# Patient Record
Sex: FEMALE | Race: BLACK OR AFRICAN AMERICAN | NOT HISPANIC OR LATINO | Employment: OTHER | URBAN - METROPOLITAN AREA
[De-identification: names, ages, dates, MRNs, and addresses within clinical notes are randomized per-mention and may not be internally consistent; named-entity substitution may affect disease eponyms.]

---

## 2023-11-03 ENCOUNTER — APPOINTMENT (OUTPATIENT)
Dept: CT IMAGING | Facility: HOSPITAL | Age: 63
End: 2023-11-03
Payer: COMMERCIAL

## 2023-11-03 ENCOUNTER — HOSPITAL ENCOUNTER (OUTPATIENT)
Facility: HOSPITAL | Age: 63
Setting detail: OBSERVATION
Discharge: HOME | End: 2023-11-06
Attending: EMERGENCY MEDICINE | Admitting: STUDENT IN AN ORGANIZED HEALTH CARE EDUCATION/TRAINING PROGRAM
Payer: COMMERCIAL

## 2023-11-03 DIAGNOSIS — R10.9 ACUTE ABDOMINAL PAIN: ICD-10-CM

## 2023-11-03 DIAGNOSIS — K57.32 SIGMOID DIVERTICULITIS: Primary | ICD-10-CM

## 2023-11-03 PROBLEM — K57.92 DIVERTICULITIS: Status: ACTIVE | Noted: 2023-11-03

## 2023-11-03 PROBLEM — E86.0 DEHYDRATION: Status: ACTIVE | Noted: 2023-11-03

## 2023-11-03 PROBLEM — K59.00 CONSTIPATION: Status: ACTIVE | Noted: 2023-11-03

## 2023-11-03 LAB
ALBUMIN SERPL-MCNC: 4.2 G/DL (ref 3.5–5.2)
ALBUMIN/GLOB SERPL: 1.2 G/DL
ALP SERPL-CCNC: 58 U/L (ref 39–117)
ALT SERPL W P-5'-P-CCNC: 17 U/L (ref 1–33)
ANION GAP SERPL CALCULATED.3IONS-SCNC: 11.6 MMOL/L (ref 5–15)
AST SERPL-CCNC: 16 U/L (ref 1–32)
BACTERIA UR QL AUTO: ABNORMAL /HPF
BASOPHILS # BLD AUTO: 0.06 10*3/MM3 (ref 0–0.2)
BASOPHILS NFR BLD AUTO: 0.4 % (ref 0–1.5)
BILIRUB SERPL-MCNC: 0.6 MG/DL (ref 0–1.2)
BILIRUB UR QL STRIP: NEGATIVE
BUN SERPL-MCNC: 14 MG/DL (ref 8–23)
BUN/CREAT SERPL: 11.4 (ref 7–25)
CALCIUM SPEC-SCNC: 9 MG/DL (ref 8.6–10.5)
CHLORIDE SERPL-SCNC: 105 MMOL/L (ref 98–107)
CLARITY UR: CLEAR
CO2 SERPL-SCNC: 22.4 MMOL/L (ref 22–29)
COLOR UR: YELLOW
CREAT SERPL-MCNC: 1.23 MG/DL (ref 0.57–1)
D-LACTATE SERPL-SCNC: 0.8 MMOL/L (ref 0.5–2)
DEPRECATED RDW RBC AUTO: 46.3 FL (ref 37–54)
EGFRCR SERPLBLD CKD-EPI 2021: 49.8 ML/MIN/1.73
EOSINOPHIL # BLD AUTO: 0.19 10*3/MM3 (ref 0–0.4)
EOSINOPHIL NFR BLD AUTO: 1.3 % (ref 0.3–6.2)
ERYTHROCYTE [DISTWIDTH] IN BLOOD BY AUTOMATED COUNT: 14.5 % (ref 12.3–15.4)
GLOBULIN UR ELPH-MCNC: 3.4 GM/DL
GLUCOSE SERPL-MCNC: 122 MG/DL (ref 65–99)
GLUCOSE UR STRIP-MCNC: NEGATIVE MG/DL
HCT VFR BLD AUTO: 38.3 % (ref 34–46.6)
HGB BLD-MCNC: 12.5 G/DL (ref 12–15.9)
HGB UR QL STRIP.AUTO: ABNORMAL
HYALINE CASTS UR QL AUTO: ABNORMAL /LPF
IMM GRANULOCYTES # BLD AUTO: 0.03 10*3/MM3 (ref 0–0.05)
IMM GRANULOCYTES NFR BLD AUTO: 0.2 % (ref 0–0.5)
KETONES UR QL STRIP: NEGATIVE
LEUKOCYTE ESTERASE UR QL STRIP.AUTO: ABNORMAL
LIPASE SERPL-CCNC: 16 U/L (ref 13–60)
LYMPHOCYTES # BLD AUTO: 3.02 10*3/MM3 (ref 0.7–3.1)
LYMPHOCYTES NFR BLD AUTO: 21.1 % (ref 19.6–45.3)
MAGNESIUM SERPL-MCNC: 2 MG/DL (ref 1.6–2.4)
MCH RBC QN AUTO: 28.5 PG (ref 26.6–33)
MCHC RBC AUTO-ENTMCNC: 32.6 G/DL (ref 31.5–35.7)
MCV RBC AUTO: 87.2 FL (ref 79–97)
MONOCYTES # BLD AUTO: 1.35 10*3/MM3 (ref 0.1–0.9)
MONOCYTES NFR BLD AUTO: 9.4 % (ref 5–12)
NEUTROPHILS NFR BLD AUTO: 67.6 % (ref 42.7–76)
NEUTROPHILS NFR BLD AUTO: 9.69 10*3/MM3 (ref 1.7–7)
NITRITE UR QL STRIP: NEGATIVE
NRBC BLD AUTO-RTO: 0 /100 WBC (ref 0–0.2)
PH UR STRIP.AUTO: 7.5 [PH] (ref 5–8)
PLATELET # BLD AUTO: 273 10*3/MM3 (ref 140–450)
PMV BLD AUTO: 9.7 FL (ref 6–12)
POTASSIUM SERPL-SCNC: 4 MMOL/L (ref 3.5–5.2)
PROT SERPL-MCNC: 7.6 G/DL (ref 6–8.5)
PROT UR QL STRIP: ABNORMAL
RBC # BLD AUTO: 4.39 10*6/MM3 (ref 3.77–5.28)
RBC # UR STRIP: ABNORMAL /HPF
REF LAB TEST METHOD: ABNORMAL
SODIUM SERPL-SCNC: 139 MMOL/L (ref 136–145)
SP GR UR STRIP: 1.01 (ref 1–1.03)
SQUAMOUS #/AREA URNS HPF: ABNORMAL /HPF
UROBILINOGEN UR QL STRIP: ABNORMAL
WBC # UR STRIP: ABNORMAL /HPF
WBC NRBC COR # BLD: 14.34 10*3/MM3 (ref 3.4–10.8)

## 2023-11-03 PROCEDURE — 96365 THER/PROPH/DIAG IV INF INIT: CPT

## 2023-11-03 PROCEDURE — 99285 EMERGENCY DEPT VISIT HI MDM: CPT

## 2023-11-03 PROCEDURE — 25010000002 MORPHINE PER 10 MG: Performed by: EMERGENCY MEDICINE

## 2023-11-03 PROCEDURE — G0378 HOSPITAL OBSERVATION PER HR: HCPCS

## 2023-11-03 PROCEDURE — 25510000001 IOPAMIDOL PER 1 ML: Performed by: EMERGENCY MEDICINE

## 2023-11-03 PROCEDURE — 25010000002 PIPERACILLIN SOD-TAZOBACTAM PER 1 G: Performed by: EMERGENCY MEDICINE

## 2023-11-03 PROCEDURE — 83605 ASSAY OF LACTIC ACID: CPT | Performed by: EMERGENCY MEDICINE

## 2023-11-03 PROCEDURE — 25010000002 PIPERACILLIN SOD-TAZOBACTAM PER 1 G: Performed by: INTERNAL MEDICINE

## 2023-11-03 PROCEDURE — 83735 ASSAY OF MAGNESIUM: CPT | Performed by: EMERGENCY MEDICINE

## 2023-11-03 PROCEDURE — 99203 OFFICE O/P NEW LOW 30 MIN: CPT | Performed by: SURGERY

## 2023-11-03 PROCEDURE — 96366 THER/PROPH/DIAG IV INF ADDON: CPT

## 2023-11-03 PROCEDURE — 87040 BLOOD CULTURE FOR BACTERIA: CPT | Performed by: EMERGENCY MEDICINE

## 2023-11-03 PROCEDURE — 83690 ASSAY OF LIPASE: CPT | Performed by: EMERGENCY MEDICINE

## 2023-11-03 PROCEDURE — 25810000003 SODIUM CHLORIDE 0.9 % SOLUTION: Performed by: EMERGENCY MEDICINE

## 2023-11-03 PROCEDURE — 96376 TX/PRO/DX INJ SAME DRUG ADON: CPT

## 2023-11-03 PROCEDURE — 80053 COMPREHEN METABOLIC PANEL: CPT | Performed by: EMERGENCY MEDICINE

## 2023-11-03 PROCEDURE — 36415 COLL VENOUS BLD VENIPUNCTURE: CPT

## 2023-11-03 PROCEDURE — 85025 COMPLETE CBC W/AUTO DIFF WBC: CPT | Performed by: EMERGENCY MEDICINE

## 2023-11-03 PROCEDURE — 25010000002 SODIUM CHLORIDE 0.9 % WITH KCL 20 MEQ 20-0.9 MEQ/L-% SOLUTION: Performed by: INTERNAL MEDICINE

## 2023-11-03 PROCEDURE — 81001 URINALYSIS AUTO W/SCOPE: CPT | Performed by: EMERGENCY MEDICINE

## 2023-11-03 PROCEDURE — 74177 CT ABD & PELVIS W/CONTRAST: CPT

## 2023-11-03 PROCEDURE — 25010000002 MORPHINE PER 10 MG: Performed by: INTERNAL MEDICINE

## 2023-11-03 PROCEDURE — 96375 TX/PRO/DX INJ NEW DRUG ADDON: CPT

## 2023-11-03 RX ORDER — DOXEPIN HYDROCHLORIDE 10 MG/1
10 CAPSULE ORAL NIGHTLY
COMMUNITY

## 2023-11-03 RX ORDER — CITALOPRAM 20 MG/1
20 TABLET ORAL DAILY
COMMUNITY

## 2023-11-03 RX ORDER — SODIUM CHLORIDE 0.9 % (FLUSH) 0.9 %
10 SYRINGE (ML) INJECTION EVERY 12 HOURS SCHEDULED
Status: DISCONTINUED | OUTPATIENT
Start: 2023-11-03 | End: 2023-11-06 | Stop reason: HOSPADM

## 2023-11-03 RX ORDER — AZELASTINE 1 MG/ML
1-2 SPRAY, METERED NASAL 2 TIMES DAILY
COMMUNITY

## 2023-11-03 RX ORDER — MORPHINE SULFATE 2 MG/ML
4 INJECTION, SOLUTION INTRAMUSCULAR; INTRAVENOUS ONCE
Status: COMPLETED | OUTPATIENT
Start: 2023-11-03 | End: 2023-11-03

## 2023-11-03 RX ORDER — BENZONATATE 100 MG/1
100 CAPSULE ORAL 3 TIMES DAILY PRN
COMMUNITY

## 2023-11-03 RX ORDER — TOPIRAMATE 50 MG/1
50 TABLET, FILM COATED ORAL EVERY MORNING
COMMUNITY

## 2023-11-03 RX ORDER — BUPROPION HYDROCHLORIDE 150 MG/1
150 TABLET, EXTENDED RELEASE ORAL 2 TIMES DAILY
COMMUNITY

## 2023-11-03 RX ORDER — ATORVASTATIN CALCIUM 10 MG/1
10 TABLET, FILM COATED ORAL DAILY
COMMUNITY

## 2023-11-03 RX ORDER — TOPIRAMATE 100 MG/1
100 TABLET, FILM COATED ORAL NIGHTLY
Status: DISCONTINUED | OUTPATIENT
Start: 2023-11-03 | End: 2023-11-06 | Stop reason: HOSPADM

## 2023-11-03 RX ORDER — DOXEPIN HYDROCHLORIDE 10 MG/1
10 CAPSULE ORAL NIGHTLY
Status: DISCONTINUED | OUTPATIENT
Start: 2023-11-03 | End: 2023-11-06 | Stop reason: HOSPADM

## 2023-11-03 RX ORDER — BUPROPION HYDROCHLORIDE 150 MG/1
150 TABLET, EXTENDED RELEASE ORAL 2 TIMES DAILY
Status: DISCONTINUED | OUTPATIENT
Start: 2023-11-03 | End: 2023-11-06 | Stop reason: HOSPADM

## 2023-11-03 RX ORDER — FLUTICASONE PROPIONATE 50 MCG
2 SPRAY, SUSPENSION (ML) NASAL DAILY
COMMUNITY

## 2023-11-03 RX ORDER — TOPIRAMATE 50 MG/1
100 TABLET, FILM COATED ORAL EVERY EVENING
COMMUNITY

## 2023-11-03 RX ORDER — SODIUM CHLORIDE AND POTASSIUM CHLORIDE 150; 900 MG/100ML; MG/100ML
50 INJECTION, SOLUTION INTRAVENOUS CONTINUOUS
Status: DISCONTINUED | OUTPATIENT
Start: 2023-11-03 | End: 2023-11-06 | Stop reason: HOSPADM

## 2023-11-03 RX ORDER — OLOPATADINE HYDROCHLORIDE 1 MG/ML
1 SOLUTION/ DROPS OPHTHALMIC 2 TIMES DAILY PRN
COMMUNITY

## 2023-11-03 RX ORDER — ALBUTEROL SULFATE 90 UG/1
2 AEROSOL, METERED RESPIRATORY (INHALATION) EVERY 6 HOURS PRN
COMMUNITY

## 2023-11-03 RX ORDER — CITALOPRAM 20 MG/1
20 TABLET ORAL DAILY
Status: DISCONTINUED | OUTPATIENT
Start: 2023-11-03 | End: 2023-11-06 | Stop reason: HOSPADM

## 2023-11-03 RX ORDER — SODIUM CHLORIDE 9 MG/ML
40 INJECTION, SOLUTION INTRAVENOUS AS NEEDED
Status: DISCONTINUED | OUTPATIENT
Start: 2023-11-03 | End: 2023-11-06 | Stop reason: HOSPADM

## 2023-11-03 RX ORDER — ACETAMINOPHEN 160 MG/5ML
650 SOLUTION ORAL EVERY 4 HOURS PRN
Status: DISCONTINUED | OUTPATIENT
Start: 2023-11-03 | End: 2023-11-06 | Stop reason: HOSPADM

## 2023-11-03 RX ORDER — ONDANSETRON 2 MG/ML
4 INJECTION INTRAMUSCULAR; INTRAVENOUS EVERY 6 HOURS PRN
Status: DISCONTINUED | OUTPATIENT
Start: 2023-11-03 | End: 2023-11-06 | Stop reason: HOSPADM

## 2023-11-03 RX ORDER — ACETAMINOPHEN 325 MG/1
650 TABLET ORAL EVERY 4 HOURS PRN
Status: DISCONTINUED | OUTPATIENT
Start: 2023-11-03 | End: 2023-11-06 | Stop reason: HOSPADM

## 2023-11-03 RX ORDER — ACETAMINOPHEN 650 MG/1
650 SUPPOSITORY RECTAL EVERY 4 HOURS PRN
Status: DISCONTINUED | OUTPATIENT
Start: 2023-11-03 | End: 2023-11-06 | Stop reason: HOSPADM

## 2023-11-03 RX ORDER — TOPIRAMATE 50 MG/1
50 TABLET, FILM COATED ORAL DAILY
Status: DISCONTINUED | OUTPATIENT
Start: 2023-11-04 | End: 2023-11-06 | Stop reason: HOSPADM

## 2023-11-03 RX ORDER — SODIUM CHLORIDE 0.9 % (FLUSH) 0.9 %
10 SYRINGE (ML) INJECTION AS NEEDED
Status: DISCONTINUED | OUTPATIENT
Start: 2023-11-03 | End: 2023-11-06 | Stop reason: HOSPADM

## 2023-11-03 RX ORDER — ALBUTEROL SULFATE 2.5 MG/3ML
2.5 SOLUTION RESPIRATORY (INHALATION) EVERY 6 HOURS PRN
Status: DISCONTINUED | OUTPATIENT
Start: 2023-11-03 | End: 2023-11-06 | Stop reason: HOSPADM

## 2023-11-03 RX ORDER — MORPHINE SULFATE 2 MG/ML
4 INJECTION, SOLUTION INTRAMUSCULAR; INTRAVENOUS EVERY 4 HOURS PRN
Status: DISCONTINUED | OUTPATIENT
Start: 2023-11-03 | End: 2023-11-06 | Stop reason: HOSPADM

## 2023-11-03 RX ADMIN — TOPIRAMATE 100 MG: 100 TABLET, FILM COATED ORAL at 20:24

## 2023-11-03 RX ADMIN — PIPERACILLIN SODIUM AND TAZOBACTAM SODIUM 3.38 G: 3; .375 INJECTION, SOLUTION INTRAVENOUS at 14:28

## 2023-11-03 RX ADMIN — BUPROPION HYDROCHLORIDE 150 MG: 150 TABLET, EXTENDED RELEASE ORAL at 20:23

## 2023-11-03 RX ADMIN — MORPHINE SULFATE 4 MG: 2 INJECTION, SOLUTION INTRAMUSCULAR; INTRAVENOUS at 17:44

## 2023-11-03 RX ADMIN — MORPHINE SULFATE 4 MG: 2 INJECTION, SOLUTION INTRAMUSCULAR; INTRAVENOUS at 12:31

## 2023-11-03 RX ADMIN — IOPAMIDOL 85 ML: 755 INJECTION, SOLUTION INTRAVENOUS at 13:21

## 2023-11-03 RX ADMIN — MORPHINE SULFATE 4 MG: 2 INJECTION, SOLUTION INTRAMUSCULAR; INTRAVENOUS at 22:16

## 2023-11-03 RX ADMIN — Medication 10 ML: at 20:13

## 2023-11-03 RX ADMIN — POTASSIUM CHLORIDE AND SODIUM CHLORIDE 100 ML/HR: 900; 150 INJECTION, SOLUTION INTRAVENOUS at 19:58

## 2023-11-03 RX ADMIN — SODIUM CHLORIDE 1000 ML: 9 INJECTION, SOLUTION INTRAVENOUS at 12:31

## 2023-11-03 RX ADMIN — CITALOPRAM 20 MG: 20 TABLET, FILM COATED ORAL at 20:23

## 2023-11-03 RX ADMIN — PIPERACILLIN SODIUM AND TAZOBACTAM SODIUM 3.38 G: 3; .375 INJECTION, SOLUTION INTRAVENOUS at 19:59

## 2023-11-03 RX ADMIN — DOXEPIN HYDROCHLORIDE 10 MG: 10 CAPSULE ORAL at 20:24

## 2023-11-03 NOTE — ED NOTES
Nursing report ED to floor  Marzena Morfin  63 y.o.  female    HPI :   Chief Complaint   Patient presents with    Abdominal Pain       Admitting doctor:   Derick Anders MD    Admitting diagnosis:   The encounter diagnosis was Sigmoid diverticulitis.    Code status:   Current Code Status       Date Active Code Status Order ID Comments User Context       Not on file            Allergies:   Erythromycin    Isolation:   No active isolations    Intake and Output    Intake/Output Summary (Last 24 hours) at 11/3/2023 1603  Last data filed at 11/3/2023 1540  Gross per 24 hour   Intake 1050 ml   Output --   Net 1050 ml       Weight:       11/03/23  1136   Weight: 89.8 kg (198 lb)       Most recent vitals:   Vitals:    11/03/23 1159 11/03/23 1259 11/03/23 1333 11/03/23 1539   BP: 118/79 123/71 121/67    BP Location:       Patient Position:       Pulse: 79 74 79 80   Resp:       Temp:       SpO2: 97% 96% 96% 99%   Weight:       Height:           Active LDAs/IV Access:   Lines, Drains & Airways       Active LDAs       Name Placement date Placement time Site Days    Peripheral IV 11/03/23 1142 Right Antecubital 11/03/23  1142  Antecubital  less than 1                    Labs (abnormal labs have a star):   Labs Reviewed   COMPREHENSIVE METABOLIC PANEL - Abnormal; Notable for the following components:       Result Value    Glucose 122 (*)     Creatinine 1.23 (*)     eGFR 49.8 (*)     All other components within normal limits    Narrative:     GFR Normal >60  Chronic Kidney Disease <60  Kidney Failure <15     URINALYSIS W/ MICROSCOPIC IF INDICATED (NO CULTURE) - Abnormal; Notable for the following components:    Blood, UA Moderate (2+) (*)     Protein, UA Trace (*)     Leuk Esterase, UA Trace (*)     All other components within normal limits   CBC WITH AUTO DIFFERENTIAL - Abnormal; Notable for the following components:    WBC 14.34 (*)     Neutrophils, Absolute 9.69 (*)     Monocytes, Absolute 1.35 (*)     All other components within  normal limits   URINALYSIS, MICROSCOPIC ONLY - Abnormal; Notable for the following components:    RBC, UA 6-10 (*)     All other components within normal limits   LIPASE - Normal   MAGNESIUM - Normal   LACTIC ACID, PLASMA - Normal   BLOOD CULTURE   BLOOD CULTURE   CBC AND DIFFERENTIAL    Narrative:     The following orders were created for panel order CBC & Differential.  Procedure                               Abnormality         Status                     ---------                               -----------         ------                     CBC Auto Differential[[id number]]        Abnormal            Final result                 Please view results for these tests on the individual orders.       EKG:   No orders to display       Meds given in ED:   Medications   morphine injection 4 mg (4 mg Intravenous Given 11/3/23 1231)   sodium chloride 0.9 % bolus 1,000 mL (0 mL Intravenous Stopped 11/3/23 1356)   iopamidol (ISOVUE-370) 76 % injection 100 mL (85 mL Intravenous Given by Other 11/3/23 1321)   piperacillin-tazobactam (ZOSYN) 3.375 g in iso-osmotic dextrose 50 ml (premix) (0 g Intravenous Stopped 11/3/23 1540)       Imaging results:  No radiology results for the last day    Ambulatory status:   - standby    Social issues:   Social History     Socioeconomic History    Marital status:        NIH Stroke Scale:       Rosy Hinkle RN  11/03/23 16:03 EDT

## 2023-11-03 NOTE — CONSULTS
General Surgery H&P/Consultation      Impression/Plan: 63-year-old lady admitted with acute diverticulitis without perforation.  Agree with clear liquid diet.  Continue Zosyn, IV fluids.  I will follow.  She will need colonoscopy after this episode resolves if nonoperative management successful.      CC: Abdominal pain    HPI:   Miss Marzena Morfin is a 63 y.o. female that presented to the hospital with 2-day history of lower abdominal pain.  She also reports mild lower abdominal pain 2 weeks ago which resolved spontaneously.  She denies prior episodes of diverticulitis.  She has previously had a colonoscopy which was without polyps.  He recently completed a Cologuard which was negative.    Past Medical History: Hyperlipidemia, depression, asthma, BOOGIE  History reviewed. No pertinent past medical history.    Past Surgical History: Hysterectomy for hemorrhage at time of  45 years ago  History reviewed. No pertinent surgical history.    Medications:  Medications Prior to Admission   Medication Sig Dispense Refill Last Dose    albuterol sulfate  (90 Base) MCG/ACT inhaler Inhale 2 puffs Every 6 (Six) Hours As Needed for Wheezing.       atorvastatin (LIPITOR) 10 MG tablet Take 1 tablet by mouth Daily.       azelastine (ASTELIN) 0.1 % nasal spray 1-2 sprays into the nostril(s) as directed by provider 2 (Two) Times a Day.       benzonatate (TESSALON) 100 MG capsule Take 1 capsule by mouth 3 (Three) Times a Day As Needed for Cough.       buPROPion SR (WELLBUTRIN SR) 150 MG 12 hr tablet Take 1 tablet by mouth 2 (Two) Times a Day.       citalopram (CeleXA) 20 MG tablet Take 1 tablet by mouth Daily.       doxepin (SINEquan) 10 MG capsule Take 1 capsule by mouth Every Night.       fluticasone (FLONASE) 50 MCG/ACT nasal spray 2 sprays into the nostril(s) as directed by provider Daily.       olopatadine (PATANOL) 0.1 % ophthalmic solution Apply 1 drop to eye(s) as directed by provider 2 (Two) Times a Day As Needed  for Allergies.       topiramate (TOPAMAX) 50 MG tablet Take 1 tablet by mouth Every Morning.       topiramate (TOPAMAX) 50 MG tablet Take 2 tablets by mouth Every Evening.          Allergies:   Allergies   Allergen Reactions    Erythromycin Nausea Only       Social History:   Social History     Socioeconomic History    Marital status:    Tobacco Use    Smoking status: Never    Smokeless tobacco: Never   Vaping Use    Vaping Use: Never used   Substance and Sexual Activity    Alcohol use: Never    Drug use: Never    Sexual activity: Never       Family History:   History reviewed. No pertinent family history.    Review of Systems:  A comprehensive review of systems was negative except for the following positives: Abdominal pain    Physical Exam:   Vitals:    11/03/23 1806   BP: 122/78   Pulse: 79   Resp: 16   Temp: 99.3 °F (37.4 °C)   SpO2: 98%     BMI: Body mass index is 33.99 kg/m².   GENERAL: no acute distress, awake and alert  HEENT: normocephalic, atraumatic, no scleral icterus, moist mucous membranes  NECK: Supple, there is no thyromegaly or lymphadenopathy  RESPIRATORY: symmetric excursion bilaterally, normal work of breathing, no wheezes  CARDIOVASCULAR: regular rate, well perfused  GASTROINTESTINAL: Soft, mildly tender to palpation across the lower abdomen, more pronounced in left lower quadrant and right lower quadrant.  No peritoneal signs   MUSCULOSKELETAL: no cyanosis, clubbing, or edema  NEUROLOGIC: alert and oriented, normal speech, cranial nerves 2-12 grossly intact, no focal deficits  SKIN: Moist, warm, no rashes, no jaundice      Pertinent labs:   Results from last 7 days   Lab Units 11/03/23  1142   WBC 10*3/mm3 14.34*   HEMOGLOBIN g/dL 12.5   HEMATOCRIT % 38.3   PLATELETS 10*3/mm3 273     Results from last 7 days   Lab Units 11/03/23  1142   SODIUM mmol/L 139   POTASSIUM mmol/L 4.0   CHLORIDE mmol/L 105   CO2 mmol/L 22.4   BUN mg/dL 14   CREATININE mg/dL 1.23*   CALCIUM mg/dL 9.0   BILIRUBIN  mg/dL 0.6   ALK PHOS U/L 58   ALT (SGPT) U/L 17   AST (SGOT) U/L 16   GLUCOSE mg/dL 122*       IMAGING:  CT abdomen pelvis reviewed demonstrating significant inflammation and stranding surrounding the proximal sigmoid colon with small amount of free fluid without organized abscess           Adam Robles MD  General and Endoscopic Surgery  [facility]    [address], [address]  [place] [address]  P: [phone]  F: [phone]

## 2023-11-03 NOTE — H&P
Internal medicine history and physical  INTERNAL MEDICINE   [facility]       Patient Identification:  Name: Marzena Morfin  Age: 63 y.o.  Sex: female  :  1960  MRN: [id number]                   Primary Care Physician: Cole Cabral                               Date of admission:11/3/2023    Chief Complaint: Progressive abdominal discomfort along with nausea and fever and chills for the last couple days.    History of Present Illness:   Patient is a 63-year-old female with past medical history remarkable for fibromyalgia, anxiety and allergies was in her usual state of her health until this past Monday and she had loose bowel movements and diarrhea which she attributed to having lots of greasy food over the weekend as part of the celebration of her birthday.  Patient gets his episodes of constipation and abdominal discomfort as part of her menstrual cycle although she has had hysterectomy and started noticing abdominal discomfort and constipation from Tuesday onwards coinciding with her previous pattern.  She did have a couple of episodes of abdominal discomfort few weeks ago that resolved on its own.  By Wednesday evening patient started feeling poorly with fever nausea headache and feeling out of sorts.  Patient had a good day yesterday she had some things done but later in the evening last night she started having similar symptoms and worse.  This morning she was in severe pain associated fever and not feeling very well and did not have any bowel movement.  She had only 1 bowel movement since Tuesday which was yesterday and was very small hard stool.  Patient usually does not get constipated.  Patient has had colonoscopy by Dr. Vasquez long time ago.  Work-up in the emergency room revealed acute sigmoid diverticulitis without perforation or free air.  Patient has been started on IV Zosyn and is being admitted for further care.      Past Medical History:  No past medical history on  "file.  Past Surgical History:  No past surgical history on file.   Home Meds:  Medications Prior to Admission   Medication Sig Dispense Refill Last Dose    albuterol sulfate  (90 Base) MCG/ACT inhaler Inhale 2 puffs Every 6 (Six) Hours As Needed for Wheezing.       atorvastatin (LIPITOR) 10 MG tablet Take 1 tablet by mouth Daily.       azelastine (ASTELIN) 0.1 % nasal spray 1-2 sprays into the nostril(s) as directed by provider 2 (Two) Times a Day.       benzonatate (TESSALON) 100 MG capsule Take 1 capsule by mouth 3 (Three) Times a Day As Needed for Cough.       buPROPion SR (WELLBUTRIN SR) 150 MG 12 hr tablet Take 1 tablet by mouth 2 (Two) Times a Day.       citalopram (CeleXA) 20 MG tablet Take 1 tablet by mouth Daily.       doxepin (SINEquan) 10 MG capsule Take 1 capsule by mouth Every Night.       fluticasone (FLONASE) 50 MCG/ACT nasal spray 2 sprays into the nostril(s) as directed by provider Daily.       olopatadine (PATANOL) 0.1 % ophthalmic solution Apply 1 drop to eye(s) as directed by provider 2 (Two) Times a Day As Needed for Allergies.       topiramate (TOPAMAX) 50 MG tablet Take 1 tablet by mouth Every Morning.       topiramate (TOPAMAX) 50 MG tablet Take 2 tablets by mouth Every Evening.        Current Meds:   No current facility-administered medications for this encounter.  Allergies:  Allergies   Allergen Reactions    Erythromycin Nausea Only     Social History:   Social History     Tobacco Use    Smoking status: Not on file    Smokeless tobacco: Not on file   Substance Use Topics    Alcohol use: Not on file      Family History:  No family history on file.       Review of Systems  See history of present illness and past medical history.    As described in history presenting illness.      Vitals:   /61   Pulse 77   Temp 99.6 °F (37.6 °C)   Resp 18   Ht 162.6 cm (64\")   Wt 89.8 kg (198 lb)   SpO2 100%   BMI 33.99 kg/m²   I/O:   Intake/Output Summary (Last 24 hours) at 11/3/2023 " 1803  Last data filed at 11/3/2023 1540  Gross per 24 hour   Intake 1050 ml   Output --   Net 1050 ml     Exam:  Patient is examined using the personal protective equipment as per guidelines from infection control for this particular patient as enacted.  Hand washing was performed before and after patient interaction.  General Appearance:    Alert, cooperative, no distress, appears stated age   Head:    Normocephalic, without obvious abnormality, atraumatic   Eyes:    PERRL, conjunctiva/corneas clear, EOM's intact, both eyes   Ears:    Normal external ear canals, both ears   Nose:   Nares normal, septum midline, mucosa normal, no drainage    or sinus tenderness   Throat:   Lips, tongue, gums normal; oral mucosa pink and moist   Neck:   Supple, symmetrical, trachea midline, no adenopathy;     thyroid:  no enlargement/tenderness/nodules; no carotid    bruit or JVD   Back:     Symmetric, no curvature, ROM normal, no CVA tenderness   Lungs:     Clear to auscultation bilaterally, respirations unlabored   Chest Wall:    No tenderness or deformity    Heart:    Regular rate and rhythm, S1 and S2 normal, no murmur, rub   or gallop   Abdomen:   Soft mild generalized tenderness more on the left lower quadrant but no guarding rigidity or rebound noted   Extremities:   Extremities normal, atraumatic, no cyanosis or edema   Pulses:   Pulses palpable in all extremities; symmetric all extremities   Skin:   Skin color normal, Skin is warm and dry,  no rashes or palpable lesions   Neurologic:   CNII-XII intact, motor strength grossly intact, sensation grossly intact to light touch, no focal deficits noted       Data Review:      I reviewed the patient's new clinical results.  Results from last 7 days   Lab Units 11/03/23  1142   WBC 10*3/mm3 14.34*   HEMOGLOBIN g/dL 12.5   PLATELETS 10*3/mm3 273     Results from last 7 days   Lab Units 11/03/23  1142   SODIUM mmol/L 139   POTASSIUM mmol/L 4.0   CHLORIDE mmol/L 105   CO2 mmol/L 22.4    BUN mg/dL 14   CREATININE mg/dL 1.23*   CALCIUM mg/dL 9.0   GLUCOSE mg/dL 122*     No radiology results for the last 30 days.  No radiology results for the last 30 days.  Microbiology Results (last 10 days)       ** No results found for the last 240 hours. **            Assessment:  Active Hospital Problems    Diagnosis  POA    **Diverticulitis [K57.92]  Yes    Dehydration [E86.0]  Unknown    Constipation [K59.00]  Unknown       Medical decision making/care plan: See admitting orders  Acute sigmoid diverticulitis-continue with IV Zosyn clear liquids pain medications and general surgery consultation.  Dehydration-continue with IV fluids and monitor.  History of fibromyalgia anxiety and depression-continue home regimen.    Derick Anders MD   11/3/2023  18:03 EDT    Parts of this note may be an electronic transcription/translation of spoken language to printed text using the Dragon dictation system.

## 2023-11-03 NOTE — ED PROVIDER NOTES
EMERGENCY DEPARTMENT ENCOUNTER    Room Number:  22/22  PCP: Cole Cabral  Historian: Patient      HPI:  Chief Complaint: Abdominal pain, cramping  A complete HPI/ROS/PMH/PSH/SH/FH are unobtainable due to: None    Context: Marzena Morfin is a 63 y.o. female who presents to the ED via private vehicle for evaluation for 4 days of lower abdominal cramping, discomfort, constipation.  Had diarrhea on Monday.  Has not had any significant nausea or vomiting.  No significant fevers, increased urinary frequency and pressure.  Decreased p.o. intake.  Reports a history of diverticulosis.      MEDICAL RECORD REVIEW    External (non-ED) record review: No prior charts for review in [facility]    PAST MEDICAL HISTORY  Active Ambulatory Problems     Diagnosis Date Noted    No Active Ambulatory Problems     Resolved Ambulatory Problems     Diagnosis Date Noted    No Resolved Ambulatory Problems     No Additional Past Medical History         PAST SURGICAL HISTORY  No past surgical history on file.      FAMILY HISTORY  No family history on file.      SOCIAL HISTORY  Social History     Socioeconomic History    Marital status:          ALLERGIES  Erythromycin        REVIEW OF SYSTEMS  Review of Systems     All systems reviewed and negative except for those discussed in HPI.       PHYSICAL EXAM    I have reviewed the triage vital signs and nursing notes.    ED Triage Vitals   Temp Heart Rate Resp BP SpO2   11/03/23 1130 11/03/23 1130 11/03/23 1130 11/03/23 1136 11/03/23 1130   99.6 °F (37.6 °C) 99 18 139/81 100 %      Temp src Heart Rate Source Patient Position BP Location FiO2 (%)   -- 11/03/23 1136 11/03/23 1136 11/03/23 1136 --    Monitor Lying Right arm        Physical Exam  General: No acute distress, nontoxic  HEENT: Mucous membranes moist, atraumatic, EOMI  Neck: Full ROM  Pulm: Symmetric chest rise, nonlabored, lungs CTAB  Cardiovascular: Regular rate and rhythm, intact distal pulses  GI: Soft, mild generalized lower abdominal  discomfort to palpation, nondistended, no rebound, no guarding, bowel sounds present  MSK: Full ROM, no deformity  Skin: Warm, dry  Neuro: Awake, alert, oriented x 4, GCS 15, moving all extremities, no focal deficits  Psych: Calm, cooperative        LAB RESULTS  Recent Results (from the past 24 hour(s))   Comprehensive Metabolic Panel    Collection Time: 11/03/23 11:42 AM    Specimen: Blood   Result Value Ref Range    Glucose 122 (H) 65 - 99 mg/dL    BUN 14 8 - 23 mg/dL    Creatinine 1.23 (H) 0.57 - 1.00 mg/dL    Sodium 139 136 - 145 mmol/L    Potassium 4.0 3.5 - 5.2 mmol/L    Chloride 105 98 - 107 mmol/L    CO2 22.4 22.0 - 29.0 mmol/L    Calcium 9.0 8.6 - 10.5 mg/dL    Total Protein 7.6 6.0 - 8.5 g/dL    Albumin 4.2 3.5 - 5.2 g/dL    ALT (SGPT) 17 1 - 33 U/L    AST (SGOT) 16 1 - 32 U/L    Alkaline Phosphatase 58 39 - 117 U/L    Total Bilirubin 0.6 0.0 - 1.2 mg/dL    Globulin 3.4 gm/dL    A/G Ratio 1.2 g/dL    BUN/Creatinine Ratio 11.4 7.0 - 25.0    Anion Gap 11.6 5.0 - 15.0 mmol/L    eGFR 49.8 (L) >60.0 mL/min/1.73   Lipase    Collection Time: 11/03/23 11:42 AM    Specimen: Blood   Result Value Ref Range    Lipase 16 13 - 60 U/L   Magnesium    Collection Time: 11/03/23 11:42 AM    Specimen: Blood   Result Value Ref Range    Magnesium 2.0 1.6 - 2.4 mg/dL   CBC Auto Differential    Collection Time: 11/03/23 11:42 AM    Specimen: Blood   Result Value Ref Range    WBC 14.34 (H) 3.40 - 10.80 10*3/mm3    RBC 4.39 3.77 - 5.28 10*6/mm3    Hemoglobin 12.5 12.0 - 15.9 g/dL    Hematocrit 38.3 34.0 - 46.6 %    MCV 87.2 79.0 - 97.0 fL    MCH 28.5 26.6 - 33.0 pg    MCHC 32.6 31.5 - 35.7 g/dL    RDW 14.5 12.3 - 15.4 %    RDW-SD 46.3 37.0 - 54.0 fl    MPV 9.7 6.0 - 12.0 fL    Platelets 273 140 - 450 10*3/mm3    Neutrophil % 67.6 42.7 - 76.0 %    Lymphocyte % 21.1 19.6 - 45.3 %    Monocyte % 9.4 5.0 - 12.0 %    Eosinophil % 1.3 0.3 - 6.2 %    Basophil % 0.4 0.0 - 1.5 %    Immature Grans % 0.2 0.0 - 0.5 %    Neutrophils, Absolute  9.69 (H) 1.70 - 7.00 10*3/mm3    Lymphocytes, Absolute 3.02 0.70 - 3.10 10*3/mm3    Monocytes, Absolute 1.35 (H) 0.10 - 0.90 10*3/mm3    Eosinophils, Absolute 0.19 0.00 - 0.40 10*3/mm3    Basophils, Absolute 0.06 0.00 - 0.20 10*3/mm3    Immature Grans, Absolute 0.03 0.00 - 0.05 10*3/mm3    nRBC 0.0 0.0 - 0.2 /100 WBC   Urinalysis With Microscopic If Indicated (No Culture) - Urine, Clean Catch    Collection Time: 11/03/23 11:54 AM    Specimen: Urine, Clean Catch   Result Value Ref Range    Color, UA Yellow Yellow, Straw    Appearance, UA Clear Clear    pH, UA 7.5 5.0 - 8.0    Specific Gravity, UA 1.015 1.005 - 1.030    Glucose, UA Negative Negative    Ketones, UA Negative Negative    Bilirubin, UA Negative Negative    Blood, UA Moderate (2+) (A) Negative    Protein, UA Trace (A) Negative    Leuk Esterase, UA Trace (A) Negative    Nitrite, UA Negative Negative    Urobilinogen, UA 0.2 E.U./dL 0.2 - 1.0 E.U./dL   Urinalysis, Microscopic Only - Urine, Clean Catch    Collection Time: 11/03/23 11:54 AM    Specimen: Urine, Clean Catch   Result Value Ref Range    RBC, UA 6-10 (A) None Seen, 0-2 /HPF    WBC, UA 0-2 None Seen, 0-2 /HPF    Bacteria, UA None Seen None Seen /HPF    Squamous Epithelial Cells, UA 0-2 None Seen, 0-2 /HPF    Hyaline Casts, UA 0-2 None Seen /LPF    Methodology Manual Light Microscopy    Lactic Acid, Plasma    Collection Time: 11/03/23 12:13 PM    Specimen: Blood   Result Value Ref Range    Lactate 0.8 0.5 - 2.0 mmol/L       Ordered the above labs and independently interpreted results. My findings will be discussed in the medical decision making section below        RADIOLOGY  CT Abdomen Pelvis With Contrast    Result Date: 11/3/2023  CT ABDOMEN AND PELVIS WITH IV CONTRAST  HISTORY: 63-year-old female with lower abdominal pain, diarrhea, and leukocytosis.  TECHNIQUE: Radiation dose reduction techniques were utilized, including automated exposure control and exposure modulation based on body size. 3 mm  images were obtained through the abdomen and pelvis after the administration of IV contrast. There are no priors for comparison.  FINDINGS: 1. There is extensive acute sigmoid diverticulitis with marked thickening of the affected segment, small amount of complex free fluid adjacently, but no fluid collection or free air. 2. There is no bowel obstruction. Appendix appears within normal limits. 3. The liver, gallbladder, spleen, pancreas, adrenals, and kidneys appear unremarkable. Very mild abdominal aortic atherosclerotic changes without aneurysmal dilatation.       Ordered the above noted radiological studies.  Independently interpreted by me and my independent review of findings can be found in the ED Course.  See dictation for official radiology interpretation.      PROCEDURES    Procedures      MEDICATIONS GIVEN IN ER    Medications   morphine injection 4 mg (4 mg Intravenous Given 11/3/23 1231)   sodium chloride 0.9 % bolus 1,000 mL (0 mL Intravenous Stopped 11/3/23 1356)   iopamidol (ISOVUE-370) 76 % injection 100 mL (85 mL Intravenous Given by Other 11/3/23 1321)   piperacillin-tazobactam (ZOSYN) 3.375 g in iso-osmotic dextrose 50 ml (premix) (3.375 g Intravenous New Bag 11/3/23 1428)         PROGRESS, DATA ANALYSIS, CONSULTS, AND MEDICAL DECISION MAKING    Please note that this section constitutes my independent interpretation of clinical data including lab results, radiology, EKG's.  This constitutes my independent professional opinion regarding differential diagnosis and management of this patient.  It may include any factors such as history from outside sources, review of external records, social determinants of health, management of medications, response to those treatments, and discussions with other providers.    Differential Diagnosis and Plan: Initial concern for diverticulitis, colitis, renal colic, IBS, constipation, dehydration, renal failure, electrolyte abnormalities, anemia, among others.  Plan  for labs, CT scan, symptomatic control, and reevaluation with results.    Additional sources:  - Discussed/ obtained information from independent historians:       - Chronic or social conditions impacting care:      - Shared decision making:  Patient and family at bedside fully updated on and in agreement with the course and plan moving forward    ED Course as of 11/03/23 1519   Fri Nov 03, 2023   1206 WBC(!): 14.34 [DC]   1206 Hemoglobin: 12.5 [DC]   1218 Glucose(!): 122 [DC]   1218 BUN: 14 [DC]   1218 Creatinine(!): 1.23 [DC]   1218 Sodium: 139 [DC]   1218 Potassium: 4.0 [DC]   1218 ALT (SGPT): 17 [DC]   1218 AST (SGOT): 16 [DC]   1218 Alkaline Phosphatase: 58 [DC]   1218 Total Bilirubin: 0.6 [DC]   1218 Lipase: 16 [DC]   1218 Magnesium: 2.0 [DC]   1218 Nitrite, UA: Negative [DC]   1218 Leukocytes, UA(!): Trace [DC]   1218 Blood, UA(!): Moderate (2+) [DC]   1244 Bacteria, UA: None Seen [DC]   1244 WBC, UA: 0-2 [DC]   1244 RBC, UA(!): 6-10 [DC]   1344 Lactate: 0.8 [DC]   1344 CT Abdomen Pelvis With Contrast  Per my independent interpretation of the CT abdomen and pelvis, no evidence of bowel obstruction, findings consistent with diverticulitis and left lower quadrant of the abdomen with potential abscess formation [DC]   1344 CT Abdomen Pelvis With Contrast  Radiology report reviewed, extensive acute sigmoid diverticulitis with marked thickening of the expected segment and a small amount of complex free fluid adjacently but no fluid collection or free air [DC]   1345 CT Abdomen Pelvis With Contrast [DC]   1516 Discussed with Dr. Anders, hospitalist, discussed patient's clinical course and findings today, treatment modalities, and need for hospitalization. [DC]      ED Course User Index  [DC] Lloyd Cortez MD       Hospitalization Considered?: yes    Orders Placed During This Visit:  Orders Placed This Encounter   Procedures    Blood Culture - Blood,    Blood Culture - Blood,    CT Abdomen Pelvis With Contrast     Comprehensive Metabolic Panel    Lipase    Urinalysis With Microscopic If Indicated (No Culture) - Urine, Clean Catch    Magnesium    CBC Auto Differential    Urinalysis, Microscopic Only - Urine, Clean Catch    Lactic Acid, Plasma    [facility] (on-call MD unless specified) Details    Initiate Observation Status    CBC & Differential       Additional orders considered but not placed:      Independent interpretation of labs, radiology studies, and discussions with consultants: See ED Course        AS OF 15:19 EDT VITALS:    BP - 121/67  HR - 79  TEMP - 99.6 °F (37.6 °C)  02 SATS - 96%        DIAGNOSIS  Final diagnoses:   Sigmoid diverticulitis         DISPOSITION  HOSPITALIZATION    Discussed treatment plan and reason for hospitalization with pt/family and hospitalizing physician.  Pt/family voiced understanding of the plan for hospitalization for further testing/treatment as needed.                 --    Please note that portions of this were completed with a voice recognition program.       Note Disclaimer: At [facility], we believe that sharing information builds trust and better relationships. You are receiving this note because you are receiving care at [facility] or recently visited. It is possible you will see health information before a provider has talked with you about it. This kind of information can be easy to misunderstand. To help you fully understand what it means for your health, we urge you to discuss this note with your provider.           Lloyd Cortez MD  11/03/23 3094

## 2023-11-03 NOTE — PROGRESS NOTES
Clinical Pharmacy Services: Medication History    Marzena Morfin is a 63 y.o. female presenting to [facility] for   Chief Complaint   Patient presents with    Abdominal Pain       She  has no past medical history on file.    Allergies as of 11/03/2023 - Reviewed 11/03/2023   Allergen Reaction Noted    Erythromycin Nausea Only 11/03/2023       Medication information was obtained from: Pharmacy   Pharmacy and Phone Number: [phone]    Prior to Admission Medications       Prescriptions Last Dose Informant Patient Reported? Taking?    albuterol sulfate  (90 Base) MCG/ACT inhaler  Pharmacy Yes Yes    Inhale 2 puffs Every 6 (Six) Hours As Needed for Wheezing.    atorvastatin (LIPITOR) 10 MG tablet  Pharmacy Yes Yes    Take 1 tablet by mouth Daily.    azelastine (ASTELIN) 0.1 % nasal spray  Pharmacy Yes Yes    1-2 sprays into the nostril(s) as directed by provider 2 (Two) Times a Day.    benzonatate (TESSALON) 100 MG capsule  Pharmacy Yes Yes    Take 1 capsule by mouth 3 (Three) Times a Day As Needed for Cough.    buPROPion SR (WELLBUTRIN SR) 150 MG 12 hr tablet  Pharmacy Yes Yes    Take 1 tablet by mouth 2 (Two) Times a Day.    citalopram (CeleXA) 20 MG tablet  Pharmacy Yes Yes    Take 1 tablet by mouth Daily.    doxepin (SINEquan) 10 MG capsule  Pharmacy Yes Yes    Take 1 capsule by mouth Every Night.    fluticasone (FLONASE) 50 MCG/ACT nasal spray  Pharmacy Yes Yes    2 sprays into the nostril(s) as directed by provider Daily.    olopatadine (PATANOL) 0.1 % ophthalmic solution  Pharmacy Yes Yes    Apply 1 drop to eye(s) as directed by provider 2 (Two) Times a Day As Needed for Allergies.    topiramate (TOPAMAX) 50 MG tablet  Pharmacy Yes Yes    Take 1 tablet by mouth Every Morning.    topiramate (TOPAMAX) 50 MG tablet  Pharmacy Yes Yes    Take 2 tablets by mouth Every Evening.              Medication notes:     This medication list is complete to the best of my knowledge as of  11/3/2023    Please call if questions.    Chuy Alcocer  Medication History Technician   [phone]    11/3/2023 18:00 EDT

## 2023-11-04 PROBLEM — D64.9 ANEMIA: Status: ACTIVE | Noted: 2023-11-04

## 2023-11-04 PROBLEM — R73.03 PREDIABETES: Status: ACTIVE | Noted: 2023-11-04

## 2023-11-04 PROBLEM — R10.84 GENERALIZED ABDOMINAL PAIN: Status: ACTIVE | Noted: 2023-11-04

## 2023-11-04 PROBLEM — D72.829 LEUKOCYTOSIS: Status: ACTIVE | Noted: 2023-11-04

## 2023-11-04 PROBLEM — N18.31 STAGE 3A CHRONIC KIDNEY DISEASE (CKD): Status: ACTIVE | Noted: 2023-11-04

## 2023-11-04 LAB
ALBUMIN SERPL-MCNC: 3.7 G/DL (ref 3.5–5.2)
ALBUMIN/GLOB SERPL: 1.3 G/DL
ALP SERPL-CCNC: 55 U/L (ref 39–117)
ALT SERPL W P-5'-P-CCNC: 16 U/L (ref 1–33)
ANION GAP SERPL CALCULATED.3IONS-SCNC: 8.3 MMOL/L (ref 5–15)
AST SERPL-CCNC: 16 U/L (ref 1–32)
BASOPHILS # BLD AUTO: 0.05 10*3/MM3 (ref 0–0.2)
BASOPHILS NFR BLD AUTO: 0.4 % (ref 0–1.5)
BILIRUB SERPL-MCNC: 0.6 MG/DL (ref 0–1.2)
BUN SERPL-MCNC: 10 MG/DL (ref 8–23)
BUN/CREAT SERPL: 9.8 (ref 7–25)
CALCIUM SPEC-SCNC: 8.4 MG/DL (ref 8.6–10.5)
CHLORIDE SERPL-SCNC: 107 MMOL/L (ref 98–107)
CO2 SERPL-SCNC: 21.7 MMOL/L (ref 22–29)
CREAT SERPL-MCNC: 1.02 MG/DL (ref 0.57–1)
D-LACTATE SERPL-SCNC: 0.5 MMOL/L (ref 0.5–2)
DEPRECATED RDW RBC AUTO: 43 FL (ref 37–54)
EGFRCR SERPLBLD CKD-EPI 2021: 62.3 ML/MIN/1.73
EOSINOPHIL # BLD AUTO: 0.22 10*3/MM3 (ref 0–0.4)
EOSINOPHIL NFR BLD AUTO: 1.9 % (ref 0.3–6.2)
ERYTHROCYTE [DISTWIDTH] IN BLOOD BY AUTOMATED COUNT: 13.8 % (ref 12.3–15.4)
GLOBULIN UR ELPH-MCNC: 2.8 GM/DL
GLUCOSE SERPL-MCNC: 100 MG/DL (ref 65–99)
HCT VFR BLD AUTO: 32.2 % (ref 34–46.6)
HGB BLD-MCNC: 10.6 G/DL (ref 12–15.9)
IMM GRANULOCYTES # BLD AUTO: 0.04 10*3/MM3 (ref 0–0.05)
IMM GRANULOCYTES NFR BLD AUTO: 0.3 % (ref 0–0.5)
LYMPHOCYTES # BLD AUTO: 2.45 10*3/MM3 (ref 0.7–3.1)
LYMPHOCYTES NFR BLD AUTO: 21 % (ref 19.6–45.3)
MCH RBC QN AUTO: 28.3 PG (ref 26.6–33)
MCHC RBC AUTO-ENTMCNC: 32.9 G/DL (ref 31.5–35.7)
MCV RBC AUTO: 86.1 FL (ref 79–97)
MONOCYTES # BLD AUTO: 1.17 10*3/MM3 (ref 0.1–0.9)
MONOCYTES NFR BLD AUTO: 10 % (ref 5–12)
NEUTROPHILS NFR BLD AUTO: 66.4 % (ref 42.7–76)
NEUTROPHILS NFR BLD AUTO: 7.74 10*3/MM3 (ref 1.7–7)
NRBC BLD AUTO-RTO: 0 /100 WBC (ref 0–0.2)
PLATELET # BLD AUTO: 230 10*3/MM3 (ref 140–450)
PMV BLD AUTO: 9.6 FL (ref 6–12)
POTASSIUM SERPL-SCNC: 3.8 MMOL/L (ref 3.5–5.2)
PROT SERPL-MCNC: 6.5 G/DL (ref 6–8.5)
RBC # BLD AUTO: 3.74 10*6/MM3 (ref 3.77–5.28)
SODIUM SERPL-SCNC: 137 MMOL/L (ref 136–145)
WBC NRBC COR # BLD: 11.67 10*3/MM3 (ref 3.4–10.8)

## 2023-11-04 PROCEDURE — 25010000002 MORPHINE PER 10 MG: Performed by: INTERNAL MEDICINE

## 2023-11-04 PROCEDURE — 25010000002 PIPERACILLIN SOD-TAZOBACTAM PER 1 G: Performed by: SURGERY

## 2023-11-04 PROCEDURE — 80053 COMPREHEN METABOLIC PANEL: CPT | Performed by: INTERNAL MEDICINE

## 2023-11-04 PROCEDURE — 97162 PT EVAL MOD COMPLEX 30 MIN: CPT

## 2023-11-04 PROCEDURE — G0378 HOSPITAL OBSERVATION PER HR: HCPCS

## 2023-11-04 PROCEDURE — 85025 COMPLETE CBC W/AUTO DIFF WBC: CPT | Performed by: INTERNAL MEDICINE

## 2023-11-04 PROCEDURE — 99213 OFFICE O/P EST LOW 20 MIN: CPT | Performed by: SURGERY

## 2023-11-04 PROCEDURE — 96366 THER/PROPH/DIAG IV INF ADDON: CPT

## 2023-11-04 PROCEDURE — 83605 ASSAY OF LACTIC ACID: CPT | Performed by: INTERNAL MEDICINE

## 2023-11-04 PROCEDURE — 96376 TX/PRO/DX INJ SAME DRUG ADON: CPT

## 2023-11-04 PROCEDURE — 97530 THERAPEUTIC ACTIVITIES: CPT

## 2023-11-04 PROCEDURE — 25010000002 PIPERACILLIN SOD-TAZOBACTAM PER 1 G: Performed by: INTERNAL MEDICINE

## 2023-11-04 PROCEDURE — 25010000002 SODIUM CHLORIDE 0.9 % WITH KCL 20 MEQ 20-0.9 MEQ/L-% SOLUTION: Performed by: INTERNAL MEDICINE

## 2023-11-04 RX ADMIN — CITALOPRAM 20 MG: 20 TABLET, FILM COATED ORAL at 20:00

## 2023-11-04 RX ADMIN — PIPERACILLIN SODIUM AND TAZOBACTAM SODIUM 3.38 G: 3; .375 INJECTION, SOLUTION INTRAVENOUS at 19:56

## 2023-11-04 RX ADMIN — Medication 10 ML: at 20:01

## 2023-11-04 RX ADMIN — POTASSIUM CHLORIDE AND SODIUM CHLORIDE 100 ML/HR: 900; 150 INJECTION, SOLUTION INTRAVENOUS at 10:24

## 2023-11-04 RX ADMIN — MORPHINE SULFATE 4 MG: 2 INJECTION, SOLUTION INTRAMUSCULAR; INTRAVENOUS at 11:41

## 2023-11-04 RX ADMIN — PIPERACILLIN SODIUM AND TAZOBACTAM SODIUM 3.38 G: 3; .375 INJECTION, SOLUTION INTRAVENOUS at 04:22

## 2023-11-04 RX ADMIN — BUPROPION HYDROCHLORIDE 150 MG: 150 TABLET, EXTENDED RELEASE ORAL at 09:23

## 2023-11-04 RX ADMIN — TOPIRAMATE 50 MG: 50 TABLET, FILM COATED ORAL at 09:24

## 2023-11-04 RX ADMIN — PIPERACILLIN SODIUM AND TAZOBACTAM SODIUM 3.38 G: 3; .375 INJECTION, SOLUTION INTRAVENOUS at 11:45

## 2023-11-04 RX ADMIN — MORPHINE SULFATE 4 MG: 2 INJECTION, SOLUTION INTRAMUSCULAR; INTRAVENOUS at 18:46

## 2023-11-04 RX ADMIN — TOPIRAMATE 100 MG: 100 TABLET, FILM COATED ORAL at 20:00

## 2023-11-04 RX ADMIN — MORPHINE SULFATE 4 MG: 2 INJECTION, SOLUTION INTRAMUSCULAR; INTRAVENOUS at 06:59

## 2023-11-04 RX ADMIN — BUPROPION HYDROCHLORIDE 150 MG: 150 TABLET, EXTENDED RELEASE ORAL at 20:00

## 2023-11-04 NOTE — THERAPY EVALUATION
Patient Name: Marzena Morfin  : 1960    MRN: [id number]                              Today's Date: 2023       Admit Date: 11/3/2023    Visit Dx:     ICD-10-CM ICD-9-CM   1. Sigmoid diverticulitis  K57.32 562.11     Patient Active Problem List   Diagnosis    Diverticulitis    Dehydration    Constipation    Stage 3a chronic kidney disease (CKD)    Generalized abdominal pain    Leukocytosis    Anemia    Prediabetes     History reviewed. No pertinent past medical history.  History reviewed. No pertinent surgical history.   General Information       Row Name 23 1450          Physical Therapy Time and Intention    Document Type evaluation  -AMITA     Mode of Treatment physical therapy  -AMITA       Row Name 23 1450          General Information    Patient Profile Reviewed yes  -AMITA     Prior Level of Function independent:;all household mobility;community mobility  -AMITA     Existing Precautions/Restrictions fall  -AMITA     Barriers to Rehab none identified  -AMITA       Row Name 23 1450          Living Environment    People in Home spouse  -AMITA       Row Name 23 1450          Home Main Entrance    Number of Stairs, Main Entrance one  -AMITA     Stair Railings, Main Entrance none  -AMITA       Row Name 23 1450          Stairs Within Home, Primary    Number of Stairs, Within Home, Primary none  -AMITA       Row Name 23 1450          Cognition    Orientation Status (Cognition) oriented x 4  -AMITA       Row Name 23 145          Safety Issues, Functional Mobility    Impairments Affecting Function (Mobility) endurance/activity tolerance;strength  -AMITA               User Key  (r) = Recorded By, (t) = Taken By, (c) = Cosigned By      Initials Name Provider Type    Cynthia Ojeda PT Physical Therapist                   Mobility       Row Name 23 145          Bed Mobility    Bed Mobility bed mobility (all) activities  -AMITA     All Activities, [place] (Bed Mobility) supervision  -AMITA      Assistive Device (Bed Mobility) bed rails;head of bed elevated  -       Row Name 11/04/23 1451          Sit-Stand Transfer    Sit-Stand [place] (Transfers) supervision  -       Row Name 11/04/23 1451          Gait/Stairs (Locomotion)    [place] Level (Gait) standby assist  -     Patient was able to Ambulate yes  -     Distance in Feet (Gait) 30  -AMITA     Deviations/Abnormal Patterns (Gait) stride length decreased;gait speed decreased  -               User Key  (r) = Recorded By, (t) = Taken By, (c) = Cosigned By      Initials Name Provider Type    Cynthia Ojeda PT Physical Therapist                   Obj/Interventions       Row Name 11/04/23 1452          Range of Motion Comprehensive    General Range of Motion no range of motion deficits identified  -[facility] Name 11/04/23 1452          Strength Comprehensive (MMT)    General Manual Muscle Testing (MMT) Assessment lower extremity strength deficits identified  -     Comment, General Manual Muscle Testing (MMT) Assessment Grossly assessed BLE >4/5 MMT  -       Row Name 11/04/23 1452          Balance    Balance Assessment standing dynamic balance  -     Dynamic Standing Balance supervision  -     Position/Device Used, Standing Balance unsupported  -     Balance Interventions UE activity with balance activity;dynamic reaching  -               User Key  (r) = Recorded By, (t) = Taken By, (c) = Cosigned By      Initials Name Provider Type    Cynthia Ojeda PT Physical Therapist                   Goals/Plan    No documentation.                  Clinical Impression       [facility] Name 11/04/23 1454          Pain    Pretreatment Pain Rating 0/10 - no pain  -       Row Name 11/04/23 1454          Plan of Care Review    Plan of Care Reviewed With patient  -     Progress improving  -     Outcome Evaluation Pt is a 62 y/o female admitted to [facility] on 11/3/23 c/o headache, abdominal pain, and constipation secondary to diverticulitis. [facility]  "includes fibromyalgia, anxiety, HLD and asthma. Prior to admission, she reports being independent with functional mobility living with  in 1 level home with 1 step to enter. Pt agreeable to participate in PT evaluation, demonstrating modified independence during bed mobility with HOB elevated using bed rails. She required supervision during transfers and standing balance tasks, and ambulated within room requiring supervision-SBA with no AD. She is safe to return home with support once medical status is stabilized. Recommendation for outpatient pelvic PT to improve self management of bowel symptoms.  -AMITA       Row Name 11/04/23 1454          Therapy Assessment/Plan (PT)    Patient/Family Therapy Goals Statement (PT) \"get better to go home and care for brother\"  -AMITA     Criteria for Skilled Interventions Met (PT) no  -AMITA     Therapy Frequency (PT) evaluation only  -AMITA               User Key  (r) = Recorded By, (t) = Taken By, (c) = Cosigned By      Initials Name Provider Type    Cynthia Ojeda, PT Physical Therapist                   Outcome Measures       Row Name 11/04/23 1501 11/04/23 0903       How much help from another person do you currently need...    Turning from your back to your side while in flat bed without using bedrails? 4  -AMITA 4  -KS    Moving from lying on back to sitting on the side of a flat bed without bedrails? 4  -AMITA 4  -KS    Moving to and from a bed to a chair (including a wheelchair)? 3  -AMITA 4  -KS    Standing up from a chair using your arms (e.g., wheelchair, bedside chair)? 3  -AMITA 4  -KS    Climbing 3-5 steps with a railing? 3  -AMITA 3  -KS    To walk in hospital room? 3  -AMITA 3  -KS    AM-PAC 6 Clicks Score (PT) 20  -AMITA 22  -KS    Highest level of mobility 6 --> Walked 10 steps or more  -AMITA 7 --> Walked 25 feet or more  -KS              User Key  (r) = Recorded By, (t) = Taken By, (c) = Cosigned By      Initials Name Provider Type    Laurie Wright, RN Registered Nurse    AMITA " Cynthia Blakely PT Physical Therapist                                 Physical Therapy Education       Title: PT OT SLP Therapies (Done)       Topic: Physical Therapy (Done)       Point: Mobility training (Done)       Learning Progress Summary             Patient Acceptance, E, VU by  at 11/4/2023 1501                         Point: Home exercise program (Done)       Learning Progress Summary             Patient Acceptance, E, VU by  at 11/4/2023 1501                         Point: Body mechanics (Done)       Learning Progress Summary             Patient Acceptance, E, VU by  at 11/4/2023 1501                         Point: Precautions (Done)       Learning Progress Summary             Patient Acceptance, E, VU by  at 11/4/2023 1501                                         User Key       Initials Effective Dates Name Provider Type Discipline     08/24/23 -  Cynthia Blakely PT Physical Therapist PT                  PT Recommendation and Plan     Plan of Care Reviewed With: patient  Progress: improving  Outcome Evaluation: Pt is a 62 y/o female admitted to [facility] on 11/3/23 c/o headache, abdominal pain, and constipation secondary to diverticulitis. PMH includes fibromyalgia, anxiety, HLD and asthma. Prior to admission, she reports being independent with functional mobility living with  in 1 level home with 1 step to enter. Pt agreeable to participate in PT evaluation, demonstrating modified independence during bed mobility with HOB elevated using bed rails. She required supervision during transfers and standing balance tasks, and ambulated within room requiring supervision-SBA with no AD. She is safe to return home with support once medical status is stabilized. Recommendation for outpatient pelvic PT to improve self management of bowel symptoms.     Time Calculation:         PT Charges       Row Name 11/04/23 1502             Time Calculation    Start Time 1337  -AMITA      Stop Time 1356  -AMITA      Time  Calculation (min) 19 min  -AMITA      PT Received On 11/04/23  -AMITA         Time Calculation- PT    Total Timed Code Minutes- PT 15 minute(s)  -AMITA         Timed Charges    13328 - PT Therapeutic Activity Minutes 15  -AMITA         Total Minutes    Timed Charges Total Minutes 15  -AMITA       Total Minutes 15  -AMITA                User Key  (r) = Recorded By, (t) = Taken By, (c) = Cosigned By      Initials Name Provider Type    Cynthia Ojeda, PT Physical Therapist                  Therapy Charges for Today       Code Description Service Date Service Provider Modifiers Qty    95872879996 HC PT THERAPEUTIC ACT EA 15 MIN 11/4/2023 Cynthia Blakely, PT GP 1    71849830922 HC PT EVAL MOD COMPLEXITY 1 11/4/2023 Cynthia Blakely, PT GP 1            PT G-Codes  AM-PAC 6 Clicks Score (PT): 20  PT Discharge Summary  Anticipated Discharge Disposition (PT): home with assist, home with outpatient therapy services, other (see comments) (Outpatient pelvic health PT services)    Cynthia Blakely PT  11/4/2023

## 2023-11-04 NOTE — PLAN OF CARE
Goal Outcome Evaluation:  Plan of Care Reviewed With: patient        Progress: improving  Outcome Evaluation: Pt is a 64 y/o female admitted to [facility] on 11/3/23 c/o headache, abdominal pain, and constipation secondary to diverticulitis. PMH includes fibromyalgia, anxiety, HLD and asthma. Prior to admission, she reports being independent with functional mobility living with  in 1 level home with 1 step to enter. Pt agreeable to participate in PT evaluation, demonstrating modified independence during bed mobility with HOB elevated using bed rails. She required supervision during transfers and standing balance tasks, and ambulated within room requiring supervision-SBA with no AD. She is safe to return home with support once medical status is stabilized. Recommendation for outpatient pelvic PT to improve self management of bowel symptoms.      Anticipated Discharge Disposition (PT): home with assist, home with outpatient therapy services, other (see comments) (Outpatient pelvic health PT services)

## 2023-11-04 NOTE — PLAN OF CARE
Goal Outcome Evaluation:    A&OX4, calm and cooperative, up ad yomi, admit from ED for acute diverticulitis. C/O pain, morphine given in ED. IV fluids ordered pending arrival from pharmacy. VSS, room air. CTM.

## 2023-11-04 NOTE — PROGRESS NOTES
IMPRESSION & PLAN:  63-year-old lady admitted with acute diverticulitis without perforation.  Leukocytosis improving.  Clear liquid diet today.  If tolerates and having bowel function we will advance her diet tomorrow.  Continue IV fluids.    CC:    Chief Complaint   Patient presents with    Abdominal Pain         HPI: Abdominal pain improved.  She is passing flatus.  Still has left lower quadrant tenderness.    ROS:   Constitutional: No fever, no chills  CV:  No chest pain. No palpitations.   Lungs:  No shortness of breath or cough.   GI: Per HPI     PE:    VS:   Vitals:    11/04/23 0539   BP: 112/71   Pulse: 74   Resp: 16   Temp: 98.7 °F (37.1 °C)   SpO2: 93%      CONST: Awake, alert  LUNGS: symmetric excursion, normal inspiratory effort  CV: regular rate, well perfused  Abdomen: Soft, mild tenderness in the left lower quadrant, no evidence of peritonitis    LABS:  WBC 11 from 14, creatinine 1.02 from 1.23    RADIOLOGY:  CT abdomen pelvis reviewed demonstrating significant inflammation and stranding surrounding the proximal sigmoid colon with small amount of free fluid without organized abscess

## 2023-11-04 NOTE — PLAN OF CARE
Goal Outcome Evaluation:    A&OX4, calm and cooperative. Up with stand by assistance to bathroom,  at bedside to help. IV ABX infusing, morphine given for pain. Clear liquid diet tolerating well, no distress noted currently CTM.

## 2023-11-04 NOTE — PROGRESS NOTES
Name: Marzena Morfin ADMIT: 11/3/2023   : 1960  PCP: CabralCole liao    MRN: [id number] LOS: 0 days   AGE/SEX: 63 y.o. female  ROOM: [facility]     Subjective   Subjective   Patient seen and examined this morning.  Hospital day 1.  At time of my examination, patient is awake, alert, resting in bed.  She continues to endorse ongoing abdominal pain, however, slightly improved with pain medication.  General surgery consulted and following.       Objective   Objective   Vital Signs  Temp:  [98.7 °F (37.1 °C)-99.6 °F (37.6 °C)] 98.7 °F (37.1 °C)  Heart Rate:  [72-99] 74  Resp:  [16-18] 16  BP: (110-139)/(61-81) 112/71  SpO2:  [93 %-100 %] 93 %  on   ;   Device (Oxygen Therapy): room air  Body mass index is 33.99 kg/m².  Physical Exam  Vitals and nursing note reviewed.   Constitutional:       Appearance: She is overweight.      Comments: Uncomfortable secondary to pain and discomfort in abdomen   Cardiovascular:      Rate and Rhythm: Normal rate and regular rhythm.      Pulses: Normal pulses.      Heart sounds: Normal heart sounds.   Pulmonary:      Effort: Pulmonary effort is normal. No respiratory distress.      Breath sounds: Normal breath sounds. No wheezing.   Abdominal:      General: Bowel sounds are normal. There is distension.      Palpations: Abdomen is soft.      Tenderness: There is abdominal tenderness. There is no guarding or rebound.   Skin:     General: Skin is warm and dry.       Results Review     I reviewed the patient's new clinical results.  Results from last 7 days   Lab Units 23  0641 23  1142   WBC 10*3/mm3 11.67* 14.34*   HEMOGLOBIN g/dL 10.6* 12.5   PLATELETS 10*3/mm3 230 273     Results from last 7 days   Lab Units 23  0641 23  1142   SODIUM mmol/L 137 139   POTASSIUM mmol/L 3.8 4.0   CHLORIDE mmol/L 107 105   CO2 mmol/L 21.7* 22.4   BUN mg/dL 10 14   CREATININE mg/dL 1.02* 1.23*   GLUCOSE mg/dL 100* 122*   EGFR mL/min/1.73 62.3 49.8*     Results from last 7 days  "  Lab Units 11/04/23  0641 11/03/23  1142   ALBUMIN g/dL 3.7 4.2   BILIRUBIN mg/dL 0.6 0.6   ALK PHOS U/L 55 58   AST (SGOT) U/L 16 16   ALT (SGPT) U/L 16 17     Results from last 7 days   Lab Units 11/04/23  0641 11/03/23  1142   CALCIUM mg/dL 8.4* 9.0   ALBUMIN g/dL 3.7 4.2   MAGNESIUM mg/dL  --  2.0     Results from last 7 days   Lab Units 11/04/23  0641 11/03/23  1213   LACTATE mmol/L 0.5 0.8     No results found for: \"HGBA1C\", \"POCGLU\"    No radiology results for the last day    I have personally reviewed all medications:  Scheduled Medications  buPROPion SR, 150 mg, Oral, BID  citalopram, 20 mg, Oral, Daily  doxepin, 10 mg, Oral, Nightly  piperacillin-tazobactam, 3.375 g, Intravenous, Q8H  sodium chloride, 10 mL, Intravenous, Q12H  topiramate, 100 mg, Oral, Nightly  topiramate, 50 mg, Oral, Daily    Infusions  sodium chloride 0.9 % with KCl 20 mEq, 100 mL/hr, Last Rate: 100 mL/hr (11/04/23 0903)    Diet  Diet: Liquid Diets; Clear Liquid; Fluid Consistency: Thin (IDDSI 0)    I have personally reviewed:  [x]  Laboratory   [x]  Microbiology   [x]  Radiology   [x]  EKG/Telemetry  [x]  Cardiology/Vascular   []  Pathology    []  Records       Assessment/Plan     Active Hospital Problems    Diagnosis  POA    **Diverticulitis [K57.92]  Yes    Dehydration [E86.0]  Unknown    Constipation [K59.00]  Unknown      Resolved Hospital Problems   No resolved problems to display.       63 y.o. female admitted with Diverticulitis.    Diverticulitis  Lower abdominal quadrant pain  Leukocytosis  - CT abdomen pelvis obtained on arrival showing extensive acute sigmoid diverticulitis with marked thickening and small amount of complex free fluid.  Imaging findings coupled with leukocytosis noted on arrival.  - Patient currently receiving IV antibiotics at this time along with IVF.  General surgery consulted and following, will continue to follow their plans and recommendations, greatly appreciate their help.  - Continue IVF and IV " antibiotics as prescribed, advance diet per general surgery.    Dehydration  - Noted on arrival, likely result of decreased oral intake, supported by slightly worsened creatinine when compared to prior baseline values.  She is currently receiving IVF, which is leading to improvement.  - Continue IVF as prescribed.    Chronic kidney disease stage 3A  - On most recent labs, patient's creatinine found to be improved overall when compared to prior, however, still above apparent baseline of around 1.0 per review of previous lab values and outside records.  - No indications to warrant acute changes and/or intervention at this time.  - Order repeat BMP in AM for reassessment of renal function.   - Will continue to monitor and trend creatinine to guide ongoing management decisions. Avoid nephrotoxic medications, IVF, strict I/O, daily weights.    Anemia  - Hemoglobin low on most recent labs, however, stable from prior. No evidence of overt blood loss. No indications for acute intervention at this time.    - Order repeat CBC in AM for reassessment. Continue to monitor, transfuse for hemoglobin <7.    Prediabetes with Hyperglycemia  - Glucose elevated on most recent labs. Patient without known history of T2DM.  Most recent hemoglobin A1c 6.10% (09/18/2023).  - Order repeat A1c at this time.  - Monitor for now, continue with POC glucose checks, can add correctional SSI if needed.    Obesity  - BMI 33.99 kg/m2. Complicating all medical problems.        SCDs for DVT prophylaxis.  Full code.  Discussed with patient, family, and nursing staff.  Anticipate discharge  TBD  timing yet to be determined..      Jeffery Vann DO  [facility]  11/04/23

## 2023-11-05 LAB
ANION GAP SERPL CALCULATED.3IONS-SCNC: 10 MMOL/L (ref 5–15)
BASOPHILS # BLD AUTO: 0.03 10*3/MM3 (ref 0–0.2)
BASOPHILS NFR BLD AUTO: 0.3 % (ref 0–1.5)
BUN SERPL-MCNC: 9 MG/DL (ref 8–23)
BUN/CREAT SERPL: 8.4 (ref 7–25)
CALCIUM SPEC-SCNC: 8.6 MG/DL (ref 8.6–10.5)
CHLORIDE SERPL-SCNC: 109 MMOL/L (ref 98–107)
CO2 SERPL-SCNC: 20 MMOL/L (ref 22–29)
CREAT SERPL-MCNC: 1.07 MG/DL (ref 0.57–1)
DEPRECATED RDW RBC AUTO: 44.6 FL (ref 37–54)
EGFRCR SERPLBLD CKD-EPI 2021: 58.9 ML/MIN/1.73
EOSINOPHIL # BLD AUTO: 0.26 10*3/MM3 (ref 0–0.4)
EOSINOPHIL NFR BLD AUTO: 2.7 % (ref 0.3–6.2)
ERYTHROCYTE [DISTWIDTH] IN BLOOD BY AUTOMATED COUNT: 14 % (ref 12.3–15.4)
GLUCOSE SERPL-MCNC: 96 MG/DL (ref 65–99)
HBA1C MFR BLD: 5.8 % (ref 4.8–5.6)
HCT VFR BLD AUTO: 30.6 % (ref 34–46.6)
HGB BLD-MCNC: 9.9 G/DL (ref 12–15.9)
IMM GRANULOCYTES # BLD AUTO: 0.03 10*3/MM3 (ref 0–0.05)
IMM GRANULOCYTES NFR BLD AUTO: 0.3 % (ref 0–0.5)
LYMPHOCYTES # BLD AUTO: 3.01 10*3/MM3 (ref 0.7–3.1)
LYMPHOCYTES NFR BLD AUTO: 31.1 % (ref 19.6–45.3)
MCH RBC QN AUTO: 28.3 PG (ref 26.6–33)
MCHC RBC AUTO-ENTMCNC: 32.4 G/DL (ref 31.5–35.7)
MCV RBC AUTO: 87.4 FL (ref 79–97)
MONOCYTES # BLD AUTO: 0.8 10*3/MM3 (ref 0.1–0.9)
MONOCYTES NFR BLD AUTO: 8.3 % (ref 5–12)
NEUTROPHILS NFR BLD AUTO: 5.54 10*3/MM3 (ref 1.7–7)
NEUTROPHILS NFR BLD AUTO: 57.3 % (ref 42.7–76)
NRBC BLD AUTO-RTO: 0 /100 WBC (ref 0–0.2)
PLATELET # BLD AUTO: 228 10*3/MM3 (ref 140–450)
PMV BLD AUTO: 9.4 FL (ref 6–12)
POTASSIUM SERPL-SCNC: 4.2 MMOL/L (ref 3.5–5.2)
RBC # BLD AUTO: 3.5 10*6/MM3 (ref 3.77–5.28)
SODIUM SERPL-SCNC: 139 MMOL/L (ref 136–145)
WBC NRBC COR # BLD: 9.67 10*3/MM3 (ref 3.4–10.8)

## 2023-11-05 PROCEDURE — 80048 BASIC METABOLIC PNL TOTAL CA: CPT | Performed by: STUDENT IN AN ORGANIZED HEALTH CARE EDUCATION/TRAINING PROGRAM

## 2023-11-05 PROCEDURE — G0378 HOSPITAL OBSERVATION PER HR: HCPCS

## 2023-11-05 PROCEDURE — 85025 COMPLETE CBC W/AUTO DIFF WBC: CPT | Performed by: STUDENT IN AN ORGANIZED HEALTH CARE EDUCATION/TRAINING PROGRAM

## 2023-11-05 PROCEDURE — 83036 HEMOGLOBIN GLYCOSYLATED A1C: CPT | Performed by: STUDENT IN AN ORGANIZED HEALTH CARE EDUCATION/TRAINING PROGRAM

## 2023-11-05 PROCEDURE — 96361 HYDRATE IV INFUSION ADD-ON: CPT

## 2023-11-05 PROCEDURE — 25010000002 MORPHINE PER 10 MG: Performed by: INTERNAL MEDICINE

## 2023-11-05 PROCEDURE — 96376 TX/PRO/DX INJ SAME DRUG ADON: CPT

## 2023-11-05 PROCEDURE — 25010000002 PIPERACILLIN SOD-TAZOBACTAM PER 1 G: Performed by: SURGERY

## 2023-11-05 PROCEDURE — 99213 OFFICE O/P EST LOW 20 MIN: CPT | Performed by: SURGERY

## 2023-11-05 PROCEDURE — 25010000002 SODIUM CHLORIDE 0.9 % WITH KCL 20 MEQ 20-0.9 MEQ/L-% SOLUTION: Performed by: INTERNAL MEDICINE

## 2023-11-05 RX ORDER — DOCUSATE SODIUM 100 MG/1
100 CAPSULE, LIQUID FILLED ORAL 2 TIMES DAILY
Status: DISCONTINUED | OUTPATIENT
Start: 2023-11-05 | End: 2023-11-06

## 2023-11-05 RX ADMIN — DOCUSATE SODIUM 100 MG: 100 CAPSULE, LIQUID FILLED ORAL at 11:36

## 2023-11-05 RX ADMIN — MORPHINE SULFATE 4 MG: 2 INJECTION, SOLUTION INTRAMUSCULAR; INTRAVENOUS at 08:37

## 2023-11-05 RX ADMIN — PIPERACILLIN SODIUM AND TAZOBACTAM SODIUM 3.38 G: 3; .375 INJECTION, SOLUTION INTRAVENOUS at 21:50

## 2023-11-05 RX ADMIN — BUPROPION HYDROCHLORIDE 150 MG: 150 TABLET, EXTENDED RELEASE ORAL at 21:52

## 2023-11-05 RX ADMIN — CITALOPRAM 20 MG: 20 TABLET, FILM COATED ORAL at 08:11

## 2023-11-05 RX ADMIN — POTASSIUM CHLORIDE AND SODIUM CHLORIDE 100 ML/HR: 900; 150 INJECTION, SOLUTION INTRAVENOUS at 07:55

## 2023-11-05 RX ADMIN — Medication 10 ML: at 08:12

## 2023-11-05 RX ADMIN — DOCUSATE SODIUM 100 MG: 100 CAPSULE, LIQUID FILLED ORAL at 21:50

## 2023-11-05 RX ADMIN — MORPHINE SULFATE 4 MG: 2 INJECTION, SOLUTION INTRAMUSCULAR; INTRAVENOUS at 01:56

## 2023-11-05 RX ADMIN — PIPERACILLIN SODIUM AND TAZOBACTAM SODIUM 3.38 G: 3; .375 INJECTION, SOLUTION INTRAVENOUS at 03:24

## 2023-11-05 RX ADMIN — Medication 10 ML: at 21:53

## 2023-11-05 RX ADMIN — PIPERACILLIN SODIUM AND TAZOBACTAM SODIUM 3.38 G: 3; .375 INJECTION, SOLUTION INTRAVENOUS at 11:36

## 2023-11-05 RX ADMIN — TOPIRAMATE 100 MG: 100 TABLET, FILM COATED ORAL at 21:53

## 2023-11-05 RX ADMIN — TOPIRAMATE 50 MG: 50 TABLET, FILM COATED ORAL at 08:10

## 2023-11-05 RX ADMIN — BUPROPION HYDROCHLORIDE 150 MG: 150 TABLET, EXTENDED RELEASE ORAL at 08:08

## 2023-11-05 NOTE — PROGRESS NOTES
Name: Marzena Morfin ADMIT: 11/3/2023   : 1960  PCP: CabralCole liao    MRN: [id number] LOS: 0 days   AGE/SEX: 63 y.o. female  ROOM: [facility]/     Subjective   Subjective   Patient seen and examined this morning.  Hospital day 2.  At time of my examination, patient is awake, alert, resting in bed.  Abdominal pain improved today when compared to prior.  Diet is being advanced.       Objective   Objective   Vital Signs  Temp:  [98.5 °F (36.9 °C)-99.1 °F (37.3 °C)] 98.8 °F (37.1 °C)  Heart Rate:  [65-75] 75  Resp:  [16] 16  BP: (100-125)/(59-79) 120/79  SpO2:  [96 %-99 %] 99 %  on   ;   Device (Oxygen Therapy): room air  Body mass index is 33.99 kg/m².  Physical Exam  Vitals and nursing note reviewed.   Constitutional:       General: She is not in acute distress.     Appearance: She is overweight.   Cardiovascular:      Rate and Rhythm: Normal rate and regular rhythm.      Pulses: Normal pulses.      Heart sounds: Normal heart sounds.   Pulmonary:      Effort: Pulmonary effort is normal. No respiratory distress.      Breath sounds: Normal breath sounds. No wheezing.   Abdominal:      General: Bowel sounds are normal.      Palpations: Abdomen is soft.      Tenderness: There is no abdominal tenderness. There is no guarding or rebound.   Skin:     General: Skin is warm and dry.       Results Review     I reviewed the patient's new clinical results.  Results from last 7 days   Lab Units 23  0717 23  0641 23  1142   WBC 10*3/mm3 9.67 11.67* 14.34*   HEMOGLOBIN g/dL 9.9* 10.6* 12.5   PLATELETS 10*3/mm3 228 230 273     Results from last 7 days   Lab Units 23  0717 23  0641 23  1142   SODIUM mmol/L 139 137 139   POTASSIUM mmol/L 4.2 3.8 4.0   CHLORIDE mmol/L 109* 107 105   CO2 mmol/L 20.0* 21.7* 22.4   BUN mg/dL 9 10 14   CREATININE mg/dL 1.07* 1.02* 1.23*   GLUCOSE mg/dL 96 100* 122*   EGFR mL/min/1.73 58.9* 62.3 49.8*     Results from last 7 days   Lab Units 23  0691  11/03/23  1142   ALBUMIN g/dL 3.7 4.2   BILIRUBIN mg/dL 0.6 0.6   ALK PHOS U/L 55 58   AST (SGOT) U/L 16 16   ALT (SGPT) U/L 16 17     Results from last 7 days   Lab Units 11/05/23  0717 11/04/23  0641 11/03/23  1142   CALCIUM mg/dL 8.6 8.4* 9.0   ALBUMIN g/dL  --  3.7 4.2   MAGNESIUM mg/dL  --   --  2.0     Results from last 7 days   Lab Units 11/04/23  0641 11/03/23  1213   LACTATE mmol/L 0.5 0.8     Hemoglobin A1C   Date/Time Value Ref Range Status   11/05/2023 0717 5.80 (H) 4.80 - 5.60 % Final       No radiology results for the last day    I have personally reviewed all medications:  Scheduled Medications  buPROPion SR, 150 mg, Oral, BID  citalopram, 20 mg, Oral, Daily  docusate sodium, 100 mg, Oral, BID  doxepin, 10 mg, Oral, Nightly  piperacillin-tazobactam, 3.375 g, Intravenous, Q8H  sodium chloride, 10 mL, Intravenous, Q12H  topiramate, 100 mg, Oral, Nightly  topiramate, 50 mg, Oral, Daily    Infusions  sodium chloride 0.9 % with KCl 20 mEq, 100 mL/hr, Last Rate: Stopped (11/05/23 0930)    Diet  Diet: Regular/House Diet, Gastrointestinal Diets; Fiber-Restricted, Low Irritant; Fluid Consistency: Thin (IDDSI 0)    I have personally reviewed:  [x]  Laboratory   [x]  Microbiology   [x]  Radiology   [x]  EKG/Telemetry  [x]  Cardiology/Vascular   []  Pathology    []  Records       Assessment/Plan     Active Hospital Problems    Diagnosis  POA    **Diverticulitis [K57.92]  Yes    Stage 3a chronic kidney disease (CKD) [N18.31]  Yes    Generalized abdominal pain [R10.84]  Yes    Leukocytosis [D72.829]  Yes    Anemia [D64.9]  Yes    Prediabetes [R73.03]  Yes    Dehydration [E86.0]  Yes    Constipation [K59.00]  Yes      Resolved Hospital Problems   No resolved problems to display.       63 y.o. female admitted with Diverticulitis.    Diverticulitis  Lower abdominal quadrant pain  Leukocytosis  - CT abdomen pelvis obtained on arrival showing extensive acute sigmoid diverticulitis with marked thickening and small amount  of complex free fluid.  Imaging findings coupled with leukocytosis noted on arrival.  - Patient currently receiving IV antibiotics at this time along with IVF.  General surgery consulted and following, will continue to follow their plans and recommendations, greatly appreciate their help.  - Continue IVF and IV antibiotics as prescribed, advance diet per general surgery.    Dehydration  - Noted on arrival, likely result of decreased oral intake, supported by slightly worsened creatinine when compared to prior baseline values.  She is currently receiving IVF, which is leading to improvement.  - Continue IVF as prescribed.    Chronic kidney disease stage 3A  - On most recent labs, patient's creatinine found to be improved overall when compared to prior, however, still above apparent baseline of around 1.0 per review of previous lab values and outside records.  - No indications to warrant acute changes and/or intervention at this time.  - Order repeat BMP in AM for reassessment of renal function.   - Will continue to monitor and trend creatinine to guide ongoing management decisions. Avoid nephrotoxic medications, IVF, strict I/O, daily weights.    Anemia  - Hemoglobin low on most recent labs, however, stable from prior. No evidence of overt blood loss. No indications for acute intervention at this time.    - Order repeat CBC in AM for reassessment. Continue to monitor, transfuse for hemoglobin <7.    Prediabetes with Hyperglycemia  - Glucose elevated on most recent labs. Patient without known history of T2DM.  Most recent hemoglobin A1c 6.10% (09/18/2023).  Repeat hemoglobin A1c 5.0% (11/05/2023).  - Monitor for now, continue with POC glucose checks, can add correctional SSI if needed.    Obesity  - BMI 33.99 kg/m2. Complicating all medical problems.    SCDs for DVT prophylaxis.  Full code.  Discussed with patient, family, and nursing staff.  Anticipate discharge home when cleared by consultants.  Possibly tomorrow  (11/06).      Jeffery Vann [facility]  11/05/23

## 2023-11-05 NOTE — PROGRESS NOTES
IMPRESSION & PLAN:  63-year-old lady admitted with acute diverticulitis without perforation.  Leukocytosis resolved.  Passing flatus but no bowel movement yet.  We will start regular diet given improvement in pain and add stool softener.  Hopefully home tomorrow.  Ambulation encouraged.    Needs colonoscopy as an outpatient once this has fully resolved.      CC:    Chief Complaint   Patient presents with    Abdominal Pain         HPI: Abdominal pain continues to improve.  No bowel movement but has been passing flatus.  Tolerating clear liquid diet without any increase in pain.    ROS:   Constitutional: No fever, no chills  CV:  No chest pain. No palpitations.   Lungs:  No shortness of breath or cough.   GI: Per HPI     PE:    VS:   Vitals:    11/05/23 0755   BP: 120/79   Pulse: 75   Resp: 16   Temp: 98.8 °F (37.1 °C)   SpO2: 99%      CONST: Awake, alert  LUNGS: symmetric excursion, normal inspiratory effort  CV: regular rate, well perfused  Abdomen: Soft, minimal tenderness in lower abdomen    LABS:  WBC 9 from 11    RADIOLOGY:  CT abdomen pelvis reviewed demonstrating significant inflammation and stranding surrounding the proximal sigmoid colon with small amount of free fluid without organized abscess

## 2023-11-05 NOTE — PLAN OF CARE
Goal Outcome Evaluation:            Patient is alert and oriented x 4, on room air, and  at bedside.  Demonstrated how to use the IS and she returned demonstrated.  She has been faithful in using the IS every hour ten repititions.  Her diet has advanced from clears to regular, GI low residue and she has tolerated.  She has also been up ambulating the hallways.    JOSE ANGEL.

## 2023-11-06 VITALS
HEIGHT: 64 IN | SYSTOLIC BLOOD PRESSURE: 126 MMHG | OXYGEN SATURATION: 100 % | BODY MASS INDEX: 33.8 KG/M2 | HEART RATE: 59 BPM | TEMPERATURE: 98.4 F | DIASTOLIC BLOOD PRESSURE: 69 MMHG | WEIGHT: 198 LBS | RESPIRATION RATE: 16 BRPM

## 2023-11-06 LAB
ANION GAP SERPL CALCULATED.3IONS-SCNC: 9.6 MMOL/L (ref 5–15)
BASOPHILS # BLD AUTO: 0.04 10*3/MM3 (ref 0–0.2)
BASOPHILS NFR BLD AUTO: 0.5 % (ref 0–1.5)
BUN SERPL-MCNC: 10 MG/DL (ref 8–23)
BUN/CREAT SERPL: 8.7 (ref 7–25)
CALCIUM SPEC-SCNC: 9.1 MG/DL (ref 8.6–10.5)
CHLORIDE SERPL-SCNC: 108 MMOL/L (ref 98–107)
CO2 SERPL-SCNC: 20.4 MMOL/L (ref 22–29)
CREAT SERPL-MCNC: 1.15 MG/DL (ref 0.57–1)
DEPRECATED RDW RBC AUTO: 41.7 FL (ref 37–54)
EGFRCR SERPLBLD CKD-EPI 2021: 54 ML/MIN/1.73
EOSINOPHIL # BLD AUTO: 0.31 10*3/MM3 (ref 0–0.4)
EOSINOPHIL NFR BLD AUTO: 4.1 % (ref 0.3–6.2)
ERYTHROCYTE [DISTWIDTH] IN BLOOD BY AUTOMATED COUNT: 13.5 % (ref 12.3–15.4)
GLUCOSE SERPL-MCNC: 99 MG/DL (ref 65–99)
HCT VFR BLD AUTO: 32 % (ref 34–46.6)
HGB BLD-MCNC: 10.4 G/DL (ref 12–15.9)
IMM GRANULOCYTES # BLD AUTO: 0.02 10*3/MM3 (ref 0–0.05)
IMM GRANULOCYTES NFR BLD AUTO: 0.3 % (ref 0–0.5)
LYMPHOCYTES # BLD AUTO: 3.01 10*3/MM3 (ref 0.7–3.1)
LYMPHOCYTES NFR BLD AUTO: 40.2 % (ref 19.6–45.3)
MCH RBC QN AUTO: 27.7 PG (ref 26.6–33)
MCHC RBC AUTO-ENTMCNC: 32.5 G/DL (ref 31.5–35.7)
MCV RBC AUTO: 85.1 FL (ref 79–97)
MONOCYTES # BLD AUTO: 0.66 10*3/MM3 (ref 0.1–0.9)
MONOCYTES NFR BLD AUTO: 8.8 % (ref 5–12)
NEUTROPHILS NFR BLD AUTO: 3.45 10*3/MM3 (ref 1.7–7)
NEUTROPHILS NFR BLD AUTO: 46.1 % (ref 42.7–76)
NRBC BLD AUTO-RTO: 0 /100 WBC (ref 0–0.2)
PLATELET # BLD AUTO: 281 10*3/MM3 (ref 140–450)
PMV BLD AUTO: 9.8 FL (ref 6–12)
POTASSIUM SERPL-SCNC: 4.3 MMOL/L (ref 3.5–5.2)
RBC # BLD AUTO: 3.76 10*6/MM3 (ref 3.77–5.28)
SODIUM SERPL-SCNC: 138 MMOL/L (ref 136–145)
WBC NRBC COR # BLD: 7.49 10*3/MM3 (ref 3.4–10.8)

## 2023-11-06 PROCEDURE — 25010000002 MORPHINE PER 10 MG: Performed by: INTERNAL MEDICINE

## 2023-11-06 PROCEDURE — G0378 HOSPITAL OBSERVATION PER HR: HCPCS

## 2023-11-06 PROCEDURE — 85025 COMPLETE CBC W/AUTO DIFF WBC: CPT | Performed by: STUDENT IN AN ORGANIZED HEALTH CARE EDUCATION/TRAINING PROGRAM

## 2023-11-06 PROCEDURE — 80048 BASIC METABOLIC PNL TOTAL CA: CPT | Performed by: STUDENT IN AN ORGANIZED HEALTH CARE EDUCATION/TRAINING PROGRAM

## 2023-11-06 PROCEDURE — 25010000002 PIPERACILLIN SOD-TAZOBACTAM PER 1 G: Performed by: SURGERY

## 2023-11-06 PROCEDURE — 99213 OFFICE O/P EST LOW 20 MIN: CPT | Performed by: SURGERY

## 2023-11-06 PROCEDURE — 96376 TX/PRO/DX INJ SAME DRUG ADON: CPT

## 2023-11-06 RX ORDER — AMOXICILLIN AND CLAVULANATE POTASSIUM 875; 125 MG/1; MG/1
1 TABLET, FILM COATED ORAL 2 TIMES DAILY
Qty: 8 TABLET | Refills: 0 | Status: SHIPPED | OUTPATIENT
Start: 2023-11-06 | End: 2023-11-10

## 2023-11-06 RX ORDER — HYDROCODONE BITARTRATE AND ACETAMINOPHEN 5; 325 MG/1; MG/1
1 TABLET ORAL EVERY 8 HOURS PRN
Qty: 6 TABLET | Refills: 0 | Status: SHIPPED | OUTPATIENT
Start: 2023-11-06

## 2023-11-06 RX ADMIN — TOPIRAMATE 50 MG: 50 TABLET, FILM COATED ORAL at 09:05

## 2023-11-06 RX ADMIN — Medication 10 ML: at 09:04

## 2023-11-06 RX ADMIN — ACETAMINOPHEN 650 MG: 325 TABLET, FILM COATED ORAL at 00:14

## 2023-11-06 RX ADMIN — BUPROPION HYDROCHLORIDE 150 MG: 150 TABLET, EXTENDED RELEASE ORAL at 09:00

## 2023-11-06 RX ADMIN — CITALOPRAM 20 MG: 20 TABLET, FILM COATED ORAL at 09:01

## 2023-11-06 RX ADMIN — MORPHINE SULFATE 4 MG: 2 INJECTION, SOLUTION INTRAMUSCULAR; INTRAVENOUS at 01:53

## 2023-11-06 RX ADMIN — PIPERACILLIN SODIUM AND TAZOBACTAM SODIUM 3.38 G: 3; .375 INJECTION, SOLUTION INTRAVENOUS at 13:14

## 2023-11-06 RX ADMIN — PIPERACILLIN SODIUM AND TAZOBACTAM SODIUM 3.38 G: 3; .375 INJECTION, SOLUTION INTRAVENOUS at 04:04

## 2023-11-06 NOTE — PROGRESS NOTES
Colorectal & General Surgery  Progress Note    Patient: Marzena Morfin  YOB: 1960  MRN: [id number]      Assessment  Marzena Morfin is a 63 y.o. female with acute uncomplicated diverticulitis who appears to be improving with intravenous antibiotics.  No peritonitis on exam today.  Tolerated diet aside from some mild nausea after dinner yesterday.  From a surgical standpoint, anticipate that she will continue to improve with conservative management alone.  If she tolerates breakfast today, okay for discharge from our standpoint.  I will arrange a follow-up appointment in the office with her in 2 to 3 weeks and plan for an outpatient colonoscopy.  Recommend discharging on oral antibiotics for total duration of at least 7 days.  Recommend high-fiber diet on discharge.    Please call with any questions or concerns.    Subjective  No acute events overnight.  Pain well controlled.  Some mild left lower quadrant after dinner yesterday.    Objective    Vitals:    11/06/23 0004   BP: 134/75   Pulse: 67   Resp: 18   Temp: 98.4 °F (36.9 °C)   SpO2: 99%       Physical Exam  Constitutional: Well-developed well-nourished, no acute distress  Neck: Supple, trachea midline  Respiratory: No increased work of breathing, Symmetric excursion  Cardiovascular: Well pefursed, no jugular venous distention evident   Abdominal: Soft, non-tender, non-distended  Skin: Warm, dry, no rash on visualized skin surfaces  Psychiatric: Alert and oriented ×3, normal affect     Laboratory Results  I have personally reviewed labs from November 5, 2023 with CBC 9, hemoglobin 9, platelets 228.  Creatinine 1.07, potassium 4.2.    Radiology  I have personally reviewed CT scan of the abdomen and pelvis demonstrates sigmoid diverticulitis with a very small locules of pericolonic air.  No abscess.         Robert Reeder MD  Colorectal & General Surgery  [facility]    [address], [address]  [place], [place], [address]  P:  [phone]  F: [phone]

## 2023-11-06 NOTE — CASE MANAGEMENT/SOCIAL WORK
Discharge Planning Assessment  Psychiatric     Patient Name: Marzena Morfin  MRN: [id number]  Today's Date: 11/6/2023    Admit Date: 11/3/2023    Plan: Home with family to transport.   Discharge Needs Assessment       Row Name 11/06/23 1318       Living Environment    People in Home spouse    Current Living Arrangements home    Potentially Unsafe Housing Conditions none    Primary Care Provided by self    Family Caregiver if Needed spouse    Quality of Family Relationships unable to assess    Able to Return to Prior Arrangements yes       Transition Planning    Patient/Family Anticipates Transition to home with family    Patient/Family Anticipated Services at Transition none    Transportation Anticipated family or friend will provide       Discharge Needs Assessment    Readmission Within the Last 30 Days no previous admission in last 30 days    Equipment Currently Used at Home none    Concerns to be Addressed denies needs/concerns at this time;discharge planning      Row Name 11/06/23 1316       Living Environment    Current Living Arrangements home    Potentially Unsafe Housing Conditions none                   Discharge Plan       Row Name 11/06/23 1315       Plan    Plan Home with family to transport.    Roadmap to Recovery Yes    Patient/Family in Agreement with Plan yes    Provided Post Acute Provider List? N/A    N/A Provider List Comment Patient denies needs    Provided Post Acute Provider Quality & Resource List? N/A    N/A Quality & Resource List Comment Patient denies needs    Plan Comments --                  Continued Care and Services - Admitted Since 11/3/2023    Coordination has not been started for this encounter.       Expected Discharge Date and Time       Expected Discharge Date Expected Discharge Time    Nov 6, 2023 11:33 AM           Demographic Summary       Row Name 11/06/23 1317       General Information    Admission Type observation    Preferred Language English      Row Name 11/06/23 1316        General Information    Admission Type observation    Referral Source admission list    Reason for Consult discharge planning    Preferred Language English                   Functional Status       Row Name 11/06/23 1318       Functional Status    Usual Activity Tolerance good    Current Activity Tolerance good      Row Name 11/06/23 1316       Functional Status    Usual Activity Tolerance good    Current Activity Tolerance good       Functional Status, IADL    Medications independent    Meal Preparation independent    Housekeeping independent    Laundry independent    Shopping independent       Mental Status    General Appearance WDL WDL       Mental Status Summary    Recent Changes in Mental Status/Cognitive Functioning no changes       Employment/    Employment Status retired                   Psychosocial    No documentation.                  Abuse/Neglect    No documentation.                  Legal    No documentation.                  Substance Abuse    No documentation.                  Patient Forms    No documentation.                     Kelly Rowe RN

## 2023-11-06 NOTE — PLAN OF CARE
Goal Outcome Evaluation:         Patient will be discharged at around 1500 so that her  can pick her up.  She is alert and oriented x 4, has no complaints of pain or distress.  She has been ambulating in the hallways, has tolerated her meals and is excited to be going home.    WCTM until discharge.

## 2023-11-06 NOTE — PLAN OF CARE
Goal Outcome Evaluation:     Patient alert and oriented room air patient self IV Right A/C N/S with 20 Meq at 50 GI soft diet nurse gave pain med see Mar for details. We will continue monitoring.

## 2023-11-06 NOTE — DISCHARGE SUMMARY
Patient Name: Marzena Morfin  : 1960  MRN: [id number]    Date of Admission: 11/3/2023  Date of Discharge:  2023  Primary Care Physician: Cole Cabral      Chief Complaint:   Abdominal Pain      Discharge Diagnoses     Active Hospital Problems    Diagnosis  POA    **Diverticulitis [K57.92]  Yes    Stage 3a chronic kidney disease (CKD) [N18.31]  Yes    Generalized abdominal pain [R10.84]  Yes    Leukocytosis [D72.829]  Yes    Anemia [D64.9]  Yes    Prediabetes [R73.03]  Yes    Dehydration [E86.0]  Yes    Constipation [K59.00]  Yes      Resolved Hospital Problems   No resolved problems to display.        Hospital Course     Ms. Morfin is a 63 y.o. female with a history of CKD stage 3A, prediabetes who presented to [facility] initially complaining of progressive abdominal discomfort along with nausea and fever and chills.  Please see the admitting history and physical for further details.  She was admitted to the hospital for further evaluation and treatment.     Diverticulitis  Lower abdominal quadrant pain  Leukocytosis  - CT abdomen pelvis obtained on arrival showing extensive acute sigmoid diverticulitis with marked thickening and small amount of complex free fluid.  Imaging findings coupled with leukocytosis noted on arrival. General surgery was consulted and followed patient during her hospital stay, she was treated with IVF and IV antibiotics along with PRN medications for symptom control, all of which led to improvement in symptoms, WBC count and clinical picture.  She was evaluated by general surgery on day of discharge, she was tolerating oral diet without difficulty, was afebrile and WBC count had normalized.  She was cleared for discharge from their perspective with plans for close outpatient follow-up.  Discussed with Dr. Reeder prior to discharge, he has arranged for outpatient follow-up and colonoscopy after discharge.  Will prescribe Augmentin to be taken for 4 more days  after discharge to complete 7-day antibiotic course as recommended by general surgery.  Also, recommend high-fiber diet, per general surgery.     Dehydration  - Noted on arrival, likely result of decreased oral intake, supported by slightly worsened creatinine when compared to prior baseline values.  She was treated with IVF, which led to improvement. No further acute intervention warranted at present, encourage good oral intake after discharge. Recommend repeat BMP with PCP within 1 week for reassessment of renal function and electrolytes.     Chronic kidney disease stage 3A  - On most recent labs, patient's creatinine found to be relatively stable when compared to prior, nearing apparent baseline of around 1.0 to 1.1 per review of previous lab values and outside records. No indications to warrant acute changes and/or intervention at this time. Recommend repeat BMP with PCP within 1 week for reassessment of renal function and electrolytes.     Anemia  - Hemoglobin low on most recent labs, however, stable from prior. No evidence of overt blood loss. No indications for acute intervention at this time.  Recommend repeat CBC with PCP within 1 week for reassessment.    Prediabetes with Hyperglycemia  - Glucose elevated on most recent labs. Patient without known history of T2DM.  Most recent hemoglobin A1c 6.10% (09/18/2023).  Repeat hemoglobin A1c 5.80% (11/05/2023). Recommend that patient check her blood glucose 2-3 times daily and record those values in log book and take with her to next PCP visit to allow for greater insight into treatment efficacy to guide further management decisions. Recommend consistent carbohydrate/diabetic diet.    Obesity  - BMI 33.99 kg/m2. Complicating all medical problems.    Day of Discharge     Subjective:  Patient seen and examined this morning.  Hospital day 3.  At time of my examination, patient is awake, alert, resting comfortably in bed.  Tolerating diet well without difficulty,  abdominal pain symptoms improving.  Evaluated by general surgery and cleared for discharge from their perspective with plans for outpatient follow-up.    Physical Exam:  Temp:  [98.4 °F (36.9 °C)] 98.4 °F (36.9 °C)  Heart Rate:  [59-67] 59  Resp:  [16-18] 16  BP: (110-134)/(60-75) 126/69  Body mass index is 33.99 kg/m².  Physical Exam  Vitals and nursing note reviewed.   Constitutional:       General: She is not in acute distress.     Appearance: She is overweight.   Cardiovascular:      Rate and Rhythm: Normal rate and regular rhythm.      Pulses: Normal pulses.      Heart sounds: Normal heart sounds.   Pulmonary:      Effort: Pulmonary effort is normal. No respiratory distress.      Breath sounds: Normal breath sounds.   Abdominal:      General: Bowel sounds are normal.      Palpations: Abdomen is soft.      Tenderness: There is no guarding or rebound.      Comments: Mild tenderness to palpation, significantly improved from prior   Skin:     General: Skin is warm and dry.         Consultants     Consult Orders (all) (From admission, onward)       Start     Ordered    11/03/23 1807  Inpatient General Surgery Consult  Once        Specialty:  General Surgery  Provider:  Kareem Shi MD    11/03/23 1807    11/03/23 1416  [facility] (on-call MD unless specified) Details  Once        Specialty:  Hospitalist  Provider:  (Not yet assigned)    11/03/23 1415                  Procedures     * Surgery not found *    Imaging Results (All)       Procedure Component Value Units Date/Time    CT Abdomen Pelvis With Contrast [[id number]] Collected: 11/03/23 1340     Updated: 11/03/23 1633    Narrative:      CT ABDOMEN AND PELVIS WITH IV CONTRAST     HISTORY: 63-year-old female with lower abdominal pain, diarrhea, and  leukocytosis.     TECHNIQUE: Radiation dose reduction techniques were utilized, including  automated exposure control and exposure modulation based on body size.   3 mm images were obtained through the abdomen and  "pelvis after the  administration of IV contrast. There are no priors for comparison.     FINDINGS:  1. There is extensive acute sigmoid diverticulitis with marked  thickening of the affected segment, small amount of complex free fluid  adjacently, but no fluid collection or free air.     2. There is no bowel obstruction. Appendix appears within normal limits.     3. The liver, gallbladder, spleen, pancreas, adrenals, and kidneys  appear unremarkable. Very mild abdominal aortic atherosclerotic changes  without aneurysmal dilatation.     This report was finalized on 11/3/2023 4:30 PM by Dr. Fide Vaughn M.D on  Workstation: [id number]                 Pertinent Labs     Results from last 7 days   Lab Units 11/06/23 0752 11/05/23 0717 11/04/23 0641 11/03/23  1142   WBC 10*3/mm3 7.49 9.67 11.67* 14.34*   HEMOGLOBIN g/dL 10.4* 9.9* 10.6* 12.5   PLATELETS 10*3/mm3 281 228 230 273     Results from last 7 days   Lab Units 11/06/23 0752 11/05/23 0717 11/04/23 0641 11/03/23  1142   SODIUM mmol/L 138 139 137 139   POTASSIUM mmol/L 4.3 4.2 3.8 4.0   CHLORIDE mmol/L 108* 109* 107 105   CO2 mmol/L 20.4* 20.0* 21.7* 22.4   BUN mg/dL 10 9 10 14   CREATININE mg/dL 1.15* 1.07* 1.02* 1.23*   GLUCOSE mg/dL 99 96 100* 122*   EGFR mL/min/1.73 54.0* 58.9* 62.3 49.8*     Results from last 7 days   Lab Units 11/04/23 0641 11/03/23  1142   ALBUMIN g/dL 3.7 4.2   BILIRUBIN mg/dL 0.6 0.6   ALK PHOS U/L 55 58   AST (SGOT) U/L 16 16   ALT (SGPT) U/L 16 17     Results from last 7 days   Lab Units 11/06/23 0752 11/05/23 0717 11/04/23 0641 11/03/23  1142   CALCIUM mg/dL 9.1 8.6 8.4* 9.0   ALBUMIN g/dL  --   --  3.7 4.2   MAGNESIUM mg/dL  --   --   --  2.0     Results from last 7 days   Lab Units 11/03/23  1142   LIPASE U/L 16             Invalid input(s): \"LDLCALC\"  Results from last 7 days   Lab Units 11/03/23  1424 11/03/23  1356   BLOODCX  No growth at 2 days No growth at 3 days         Test Results Pending at Discharge     Pending " Labs       Order Current Status    Blood Culture - Blood, Arm, Right Preliminary result    Blood Culture - Blood, Hand, Left Preliminary result            Discharge Details        Discharge Medications        New Medications        Instructions Start Date   amoxicillin-clavulanate 875-125 MG per tablet  Commonly known as: AUGMENTIN   1 tablet, Oral, 2 Times Daily      HYDROcodone-acetaminophen 5-325 MG per tablet  Commonly known as: Norco   1 tablet, Oral, Every 8 Hours PRN             Continue These Medications        Instructions Start Date   albuterol sulfate  (90 Base) MCG/ACT inhaler  Commonly known as: PROVENTIL HFA;VENTOLIN HFA;PROAIR HFA   2 puffs, Inhalation, Every 6 Hours PRN      atorvastatin 10 MG tablet  Commonly known as: LIPITOR   10 mg, Oral, Daily      azelastine 0.1 % nasal spray  Commonly known as: ASTELIN   1-2 sprays, Nasal, 2 Times Daily      benzonatate 100 MG capsule  Commonly known as: TESSALON   100 mg, Oral, 3 Times Daily PRN      buPROPion  MG 12 hr tablet  Commonly known as: WELLBUTRIN SR   150 mg, Oral, 2 Times Daily      citalopram 20 MG tablet  Commonly known as: CeleXA   20 mg, Oral, Daily      doxepin 10 MG capsule  Commonly known as: SINEquan   10 mg, Oral, Nightly      fluticasone 50 MCG/ACT nasal spray  Commonly known as: FLONASE   2 sprays, Nasal, Daily      olopatadine 0.1 % ophthalmic solution  Commonly known as: PATANOL   1 drop, Ophthalmic, 2 Times Daily PRN      topiramate 50 MG tablet  Commonly known as: TOPAMAX   50 mg, Oral, Every Morning      topiramate 50 MG tablet  Commonly known as: TOPAMAX   100 mg, Oral, Every Evening               Allergies   Allergen Reactions    Erythromycin Nausea Only       Discharge Disposition:  Home or Self Care      Discharge Diet:  Diet Order   Procedures    Diet: Regular/House Diet, Gastrointestinal Diets; Fiber-Restricted, Low Irritant; Fluid Consistency: Thin (IDDSI 0)       Discharge Activity:   Activity Instructions        Gradually Increase Activity Until at Pre-Hospitalization Level              CODE STATUS:    Code Status and Medical Interventions:   Ordered at: 11/03/23 1807     Code Status (Patient has no pulse and is not breathing):    CPR (Attempt to Resuscitate)     Medical Interventions (Patient has pulse or is breathing):    Full Support       No future appointments.  Additional Instructions for the Follow-ups that You Need to Schedule       Discharge Follow-up with PCP   As directed       Currently Documented PCP:    Cole Cabral    PCP Phone Number:    [phone]     Follow Up Details: Within 1 week after discharge for reassessment, medication and symptom review, blood pressure and blood glucose check, CMP and CBC        Discharge Follow-up with Specialty: General surgery   As directed      Specialty: General surgery   Follow Up Details: Within 1 week after discharge or as scheduled.  Please call their office to ensure a follow-up appointment is made for you.               Follow-up Information       Cole Cabral .    Specialty: Family Medicine  Why: Within 1 week after discharge for reassessment, medication and symptom review, blood pressure and blood glucose check, CMP and CBC  Contact information:  [address] [address]  [phone]                             Additional Instructions for the Follow-ups that You Need to Schedule       Discharge Follow-up with PCP   As directed       Currently Documented PCP:    Cole Cabral    PCP Phone Number:    [phone]     Follow Up Details: Within 1 week after discharge for reassessment, medication and symptom review, blood pressure and blood glucose check, CMP and CBC        Discharge Follow-up with Specialty: General surgery   As directed      Specialty: General surgery   Follow Up Details: Within 1 week after discharge or as scheduled.  Please call their office to ensure a follow-up appointment is made for you.            Time Spent on  Discharge:  Greater than 30 minutes      Jeffery Vann DO  [facility]  11/06/23  13:01 EST

## 2023-11-07 ENCOUNTER — READMISSION MANAGEMENT (OUTPATIENT)
Dept: CALL CENTER | Facility: HOSPITAL | Age: 63
End: 2023-11-07
Payer: COMMERCIAL

## 2023-11-08 LAB
BACTERIA SPEC AEROBE CULT: NORMAL
BACTERIA SPEC AEROBE CULT: NORMAL

## 2023-11-08 NOTE — OUTREACH NOTE
Prep Survey      Flowsheet Row Responses   Jew facility patient discharged from? [facility]   Is LACE score < 7 ? Yes   Eligibility Readm Mgmt   Discharge diagnosis Diverticulitis   Does the patient have one of the following disease processes/diagnoses(primary or secondary)? Other   Does the patient have Home health ordered? No   Is there a DME ordered? No   Comments regarding appointments Appointments on AVS   Medication alerts for this patient Alex SAWANT   Prep survey completed? Yes            ARASELI PALACIOS - Registered Nurse

## 2023-11-08 NOTE — CASE MANAGEMENT/SOCIAL WORK
Case Management Discharge Note      Final Note: Patient discharge home. Orders reviewed no further discharge needs.    Provided Post Acute Provider List?: N/A  N/A Provider List Comment: Patient denies needs  Provided Post Acute Provider Quality & Resource List?: N/A  N/A Quality & Resource List Comment: Patient denies needs    Selected Continued Care - Discharged on 11/6/2023 Admission date: 11/3/2023 - Discharge disposition: Home or Self Care      Destination    No services have been selected for the patient.                Durable Medical Equipment    No services have been selected for the patient.                Dialysis/Infusion    No services have been selected for the patient.                Home Medical Care    No services have been selected for the patient.                Therapy    No services have been selected for the patient.                Community Resources    No services have been selected for the patient.                Community & DME    No services have been selected for the patient.                         Final Discharge Disposition Code: 01 - home or self-care

## 2023-11-16 ENCOUNTER — OFFICE VISIT (OUTPATIENT)
Dept: SURGERY | Facility: CLINIC | Age: 63
End: 2023-11-16
Payer: COMMERCIAL

## 2023-11-16 VITALS
SYSTOLIC BLOOD PRESSURE: 128 MMHG | WEIGHT: 197.6 LBS | HEIGHT: 64 IN | DIASTOLIC BLOOD PRESSURE: 84 MMHG | BODY MASS INDEX: 33.73 KG/M2

## 2023-11-16 DIAGNOSIS — K57.92 DIVERTICULITIS: Primary | ICD-10-CM

## 2023-11-16 PROBLEM — E66.9 OBESITY (BMI 30.0-34.9): Status: ACTIVE | Noted: 2023-11-16

## 2023-11-16 PROCEDURE — 99213 OFFICE O/P EST LOW 20 MIN: CPT | Performed by: SURGERY

## 2023-11-16 NOTE — PROGRESS NOTES
Colorectal & General Surgery  History & Physical    Patient: Marzena Morfin  YOB: 1960  MRN: [id number]      Assessment  Marzena Morfin is a 63 y.o. female with recent episode of acute uncomplicated diverticulitis.  She has recovered quite well.  I have advised her to liberalize her diet.  We also discussed the importance of colonoscopy to evaluate her sigmoid colon and recent episode of diverticulitis as well as for colon cancer screening purposes.  We discussed the risk, benefits, alternatives, including the risk of perforation.  She wishes to proceed with colonoscopy.  We will schedule her for sometime in December.    Colonoscopy due: Now      Chief Complaint: Hospital follow-up    History of Present Illness   Marzena Morfin is a 63 y.o. female who was recently hospitalized with acute uncomplicated diverticulitis.  She recovered well on intravenous antibiotics that were eventually transitioned to oral.  She is tolerating her diet without any pain.  She is having bowel function daily.  Denies hematochezia, melena, tenesmus.    Most recent colonoscopy: 2013    Past Medical History   Past Medical History:   Diagnosis Date    Arthritis     Asthma     Chronic kidney disease (CKD), active medical management without dialysis, stage 3 (moderate)     Cystitis with hematuria     Diverticulitis of colon     Diverticulosis     Fibromyalgia affecting multiple sites     Fibromyalgia muscle pain     Prediabetes         Past Surgical History   Past Surgical History:   Procedure Laterality Date    ABDOMINAL HYSTERECTOMY      EYE SURGERY  January 2022    cataracts       Social History  Social History     Socioeconomic History    Marital status:    Tobacco Use    Smoking status: Former     Packs/day: 0.50     Years: 15.00     Additional pack years: 0.00     Total pack years: 7.50     Types: Cigarettes    Smokeless tobacco: Never    Tobacco comments:     quit 29 years ago   Vaping Use    Vaping Use: Never used    Substance and Sexual Activity    Alcohol use: Yes     Alcohol/week: 5.0 - 6.0 standard drinks of alcohol     Types: 4 Glasses of wine, 1 - 2 Drinks containing 0.5 oz of alcohol per week    Drug use: Never    Sexual activity: Not Currently     Partners: Male     Birth control/protection: Other, Hysterectomy       Family History  Family History   Problem Relation Age of Onset    COPD Mother             Asthma Mother     Hypertension Mother             Arthritis Mother             Depression Mother             Arthritis Father             Cancer Father         mesotheleoma/    Diabetes type II Maternal Grandmother     Hypertension Maternal Grandmother             Heart failure Maternal Grandmother     Heart disease Maternal Grandmother             Cancer Maternal Grandfather             Mental illness Sister         schizophrenia    Mental illness Brother         schizophrenia       Colorectal cancer family history: None    Review of Systems  Negative except as documented in the HPI.     Allergies  Allergies   Allergen Reactions    Erythromycin Nausea Only       Medications    Current Outpatient Medications:     albuterol sulfate  (90 Base) MCG/ACT inhaler, Inhale 2 puffs Every 6 (Six) Hours As Needed for Wheezing., Disp: , Rfl:     atorvastatin (LIPITOR) 10 MG tablet, Take 1 tablet by mouth Daily., Disp: , Rfl:     azelastine (ASTELIN) 0.1 % nasal spray, 1-2 sprays into the nostril(s) as directed by provider 2 (Two) Times a Day., Disp: , Rfl:     benzonatate (TESSALON) 100 MG capsule, Take 1 capsule by mouth 3 (Three) Times a Day As Needed for Cough., Disp: , Rfl:     buPROPion SR (WELLBUTRIN SR) 150 MG 12 hr tablet, Take 1 tablet by mouth 2 (Two) Times a Day., Disp: , Rfl:     citalopram (CeleXA) 20 MG tablet, Take 1 tablet by mouth Daily., Disp: , Rfl:     doxepin (SINEquan) 10 MG capsule, Take 1 capsule by mouth Every Night., Disp: ,  Rfl:     fluticasone (FLONASE) 50 MCG/ACT nasal spray, 2 sprays into the nostril(s) as directed by provider Daily., Disp: , Rfl:     olopatadine (PATANOL) 0.1 % ophthalmic solution, Apply 1 drop to eye(s) as directed by provider 2 (Two) Times a Day As Needed for Allergies., Disp: , Rfl:     topiramate (TOPAMAX) 50 MG tablet, Take 1 tablet by mouth Every Morning., Disp: , Rfl:     topiramate (TOPAMAX) 50 MG tablet, Take 2 tablets by mouth Every Evening., Disp: , Rfl:     Vital Signs  Vitals:    11/16/23 1119   BP: 128/84        Physical Exam  Constitutional: Resting comfortably, no acute distress  Neck: Supple, trachea midline  Respiratory: No increased work of breathing, Symmetric excursion  Cardiovascular: Well pefursed, no jugular venous distention evident   Abdominal:  Soft, non-tender, non-distended  Lymphatics: No cervical or suprascapular adenopathy  Skin: Warm, dry, no rash on visualized skin surfaces  Musculoskeletal: Symmetric strength, no obvious gross abnormalities  Psychiatric: Alert and oriented ×3, normal affect     Laboratory Results  None to review    Radiology  None to review    Endoscopy  I have personally reviewed colonoscopy report from 2013 by Dr. Vasquez demonstrating diverticulosis and otherwise normal colon.         Robert Reeder MD  Colorectal & General Surgery  [facility]    [address], [address]  [place], [place], [address]  P: [phone]  F: [phone]

## 2023-12-29 ENCOUNTER — HOSPITAL ENCOUNTER (OUTPATIENT)
Facility: HOSPITAL | Age: 63
Setting detail: HOSPITAL OUTPATIENT SURGERY
Discharge: HOME | End: 2023-12-29
Attending: SURGERY | Admitting: SURGERY
Payer: COMMERCIAL

## 2023-12-29 ENCOUNTER — ANESTHESIA (OUTPATIENT)
Dept: GASTROENTEROLOGY | Facility: HOSPITAL | Age: 63
End: 2023-12-29
Payer: COMMERCIAL

## 2023-12-29 ENCOUNTER — ANESTHESIA EVENT (OUTPATIENT)
Dept: GASTROENTEROLOGY | Facility: HOSPITAL | Age: 63
End: 2023-12-29
Payer: COMMERCIAL

## 2023-12-29 VITALS
DIASTOLIC BLOOD PRESSURE: 86 MMHG | OXYGEN SATURATION: 100 % | HEART RATE: 64 BPM | BODY MASS INDEX: 33.3 KG/M2 | RESPIRATION RATE: 16 BRPM | SYSTOLIC BLOOD PRESSURE: 135 MMHG | WEIGHT: 194 LBS

## 2023-12-29 DIAGNOSIS — K57.92 DIVERTICULITIS: ICD-10-CM

## 2023-12-29 PROCEDURE — S0260 H&P FOR SURGERY: HCPCS | Performed by: SURGERY

## 2023-12-29 PROCEDURE — 45378 DIAGNOSTIC COLONOSCOPY: CPT | Performed by: SURGERY

## 2023-12-29 PROCEDURE — 25810000003 LACTATED RINGERS PER 1000 ML: Performed by: SURGERY

## 2023-12-29 PROCEDURE — 25010000002 PROPOFOL 10 MG/ML EMULSION: Performed by: ANESTHESIOLOGY

## 2023-12-29 RX ORDER — PROPOFOL 10 MG/ML
VIAL (ML) INTRAVENOUS AS NEEDED
Status: DISCONTINUED | OUTPATIENT
Start: 2023-12-29 | End: 2023-12-29 | Stop reason: SURG

## 2023-12-29 RX ORDER — EPHEDRINE SULFATE 50 MG/ML
INJECTION, SOLUTION INTRAVENOUS AS NEEDED
Status: DISCONTINUED | OUTPATIENT
Start: 2023-12-29 | End: 2023-12-29 | Stop reason: SURG

## 2023-12-29 RX ORDER — LIDOCAINE HYDROCHLORIDE 20 MG/ML
INJECTION, SOLUTION INFILTRATION; PERINEURAL AS NEEDED
Status: DISCONTINUED | OUTPATIENT
Start: 2023-12-29 | End: 2023-12-29 | Stop reason: SURG

## 2023-12-29 RX ORDER — SODIUM CHLORIDE, SODIUM LACTATE, POTASSIUM CHLORIDE, CALCIUM CHLORIDE 600; 310; 30; 20 MG/100ML; MG/100ML; MG/100ML; MG/100ML
30 INJECTION, SOLUTION INTRAVENOUS CONTINUOUS PRN
Status: DISCONTINUED | OUTPATIENT
Start: 2023-12-29 | End: 2023-12-29 | Stop reason: HOSPADM

## 2023-12-29 RX ADMIN — SODIUM CHLORIDE, POTASSIUM CHLORIDE, SODIUM LACTATE AND CALCIUM CHLORIDE 30 ML/HR: 600; 310; 30; 20 INJECTION, SOLUTION INTRAVENOUS at 13:04

## 2023-12-29 RX ADMIN — PROPOFOL 200 MCG/KG/MIN: 10 INJECTION, EMULSION INTRAVENOUS at 13:27

## 2023-12-29 RX ADMIN — PROPOFOL 150 MG: 10 INJECTION, EMULSION INTRAVENOUS at 13:27

## 2023-12-29 RX ADMIN — EPHEDRINE SULFATE 10 MG: 50 INJECTION INTRAVENOUS at 13:42

## 2023-12-29 RX ADMIN — LIDOCAINE HYDROCHLORIDE 60 MG: 20 INJECTION, SOLUTION INFILTRATION; PERINEURAL at 13:27

## 2023-12-29 NOTE — DISCHARGE INSTRUCTIONS
For the next 24 hours patient needs to be with a responsible adult.    For 24 hours DO NOT drive, operate machinery, appliances, drink alcohol, make important decisions or sign legal documents.    Start with a light or bland diet if you are feeling sick to your stomach otherwise advance to regular diet as tolerated.    Follow recommendations on procedure report if provided by your doctor.    Call Dr. Reeder for problems [phone]    Problems may include but not limited to: large amounts of bleeding, trouble breathing, repeated vomiting, severe unrelieved pain, fever or chills.

## 2023-12-29 NOTE — ANESTHESIA POSTPROCEDURE EVALUATION
Patient: Marzena Morfin    Procedure Summary       Date: 12/29/23 Room / Location:  [facility]    Anesthesia Start: 1322 Anesthesia Stop: 1347    Procedure: COLONOSCOPY to cecum Diagnosis:       Diverticulitis      (Diverticulitis [K57.92])    Surgeons: Grzegorz Reeder MD Provider: Arnav Sharpe MD    Anesthesia Type: MAC ASA Status: 2            Anesthesia Type: MAC    Vitals  Vitals Value Taken Time   /83 12/29/23 1407   Temp     Pulse 64 12/29/23 1409   Resp 16 12/29/23 1406   SpO2 98 % 12/29/23 1409   Vitals shown include unfiled device data.        Post Anesthesia Care and Evaluation    Patient location during evaluation: PHASE II  Patient participation: complete - patient participated  Level of consciousness: awake and alert  Pain management: adequate    Airway patency: patent  Anesthetic complications: No anesthetic complications  PONV Status: none  Cardiovascular status: acceptable and hemodynamically stable  Respiratory status: acceptable, nonlabored ventilation and spontaneous ventilation  Hydration status: acceptable

## 2023-12-29 NOTE — ANESTHESIA PREPROCEDURE EVALUATION
Anesthesia Evaluation     Patient summary reviewed and Nursing notes reviewed   NPO Solid Status: > 8 hours  NPO Liquid Status: > 4 hours           Airway   Mallampati: II  TM distance: >3 FB  Neck ROM: full  No difficulty expected  Dental - normal exam     Pulmonary - normal exam   (+) a smoker Former, asthma,  Cardiovascular - normal exam        Neuro/Psych  GI/Hepatic/Renal/Endo    (+) obesity, renal disease- CRI    Musculoskeletal     (+) myalgias      ROS comment: Fibromyalgia  Abdominal   (+) obese   Substance History      OB/GYN          Other   arthritis, blood dyscrasia anemia,       Other Comment: Hgb 10.4                Anesthesia Plan    ASA 2     MAC     intravenous induction     Anesthetic plan, risks, benefits, and alternatives have been provided, discussed and informed consent has been obtained with: patient.    CODE STATUS:

## 2023-12-29 NOTE — OP NOTE
Colorectal & General Surgery  Operative Report    Patient: Marzena Morfin  YOB: 1960  MRN: [id number]  DATE OF PROCEDURE: 12/29/23     PREOPERATIVE DIAGNOSIS:  Recent episode of diverticulitis     POSTOPERATIVE DIAGNOSIS:  Diverticulosis    PROCEDURE:  Colonoscopy to cecum     FINDINGS:  Normal colon aside from diverticulosis.  No mucosal abnormalities within the sigmoid colon to suggest any other etiology of her diverticulitis.    RECOMMENDATIONS:   Reviewed colonoscopy in 10 years  High-fiber diet    SURGEON:  Robert Reeder MD    ANESTHESIA:  Monitored anesthesia care    EBL:  None    SPECIMEN:  None    OPERATIVE DESCRIPTION:  The patient was brought to the endoscopy suite under the care of the nursing staff.  A preoperative timeout was performed.  Monitored anesthesia care was initiated.  A digital rectal examination was performed.  The endoscope was inserted into the anus and advanced carefully to the cecum.  The endoscope was then withdrawn and the entire mucosal surface of the colon and rectum were visualized.    Retroflexion was performed in the rectum.  The scope was then withdrawn.    The patient tolerated the procedure well and was transferred to the postanesthesia care unit in stable condition.    Robert Reeder M.D.  Colorectal & General Surgery  [facility]    [address] [place] [address], [address]  [place], [place], [address]  P: [phone]  F: [phone]

## 2023-12-29 NOTE — H&P
Colorectal & General Surgery  History and Physical    Patient: Marzena Morfin  YOB: 1960  MRN: [id number]      Assessment  Marzena Morfin is a 63 y.o. female with history of recent episode of diverticular disease who presents for screening colonoscopy today.    Plan  Proceed with colonoscopy      History of Present Illness   Marzena Morfin is a 63 y.o. female with recent episode of diverticular disease, uncomplicated, who presents for screening colonoscopy today.    Past Medical History   Past Medical History:   Diagnosis Date    Arthritis     Asthma     Chronic kidney disease (CKD), active medical management without dialysis, stage 3 (moderate)     Cystitis with hematuria     Diverticulitis of colon     Diverticulosis     Fibromyalgia affecting multiple sites     Fibromyalgia muscle pain     History of transfusion     during childbirth (approx 6 units rec'd per patient)    Prediabetes         Past Surgical History   Past Surgical History:   Procedure Laterality Date    ABDOMINAL HYSTERECTOMY      COLONOSCOPY      EYE SURGERY  2022    cataracts       Social History  Social History     Socioeconomic History    Marital status:    Tobacco Use    Smoking status: Former     Packs/day: 0.50     Years: 15.00     Additional pack years: 0.00     Total pack years: 7.50     Types: Cigarettes     Quit date:      Years since quittin.0    Smokeless tobacco: Never    Tobacco comments:     quit 29 years ago   Vaping Use    Vaping Use: Never used   Substance and Sexual Activity    Alcohol use: Yes     Alcohol/week: 5.0 - 6.0 standard drinks of alcohol     Types: 4 Glasses of wine, 1 - 2 Drinks containing 0.5 oz of alcohol per week    Drug use: Never    Sexual activity: Not Currently     Partners: Male     Birth control/protection: Other, Hysterectomy       Family History  Family History   Problem Relation Age of Onset    COPD Mother             Asthma Mother     Hypertension Mother              Arthritis Mother             Depression Mother             Arthritis Father             Cancer Father         mesotheleoma/    Mental illness Sister         schizophrenia    Mental illness Brother         schizophrenia    Diabetes type II Maternal Grandmother     Hypertension Maternal Grandmother             Heart failure Maternal Grandmother     Heart disease Maternal Grandmother             Cancer Maternal Grandfather             Malig Hyperthermia Neg Hx        Review of Systems  Negative except as documented in the HPI.     Allergies  Allergies   Allergen Reactions    Erythromycin Nausea Only       Medications    Current Facility-Administered Medications:     lactated ringers infusion, 30 mL/hr, Intravenous, Continuous PRN, Grzegorz Reeder MD, Last Rate: 30 mL/hr at 23 1304, 30 mL/hr at 23 1304    Vital Signs  Vitals:    23 1249   BP: 142/80   Pulse: 60   Resp: 16   SpO2: 99%        Physical Exam  Constitutional: Resting comfortably, no acute distress  Neck: Supple, trachea midline  Respiratory: No increased work of breathing, Symmetric excursion  Cardiovascular: Well pefursed, no jugular venous distention evident   Abdominal:  Soft, non-tender, non-distended  Lymphatics: No cervical or suprascapular adenopathy  Skin: Warm, dry, no rash on visualized skin surfaces  Musculoskeletal: Symmetric strength, no obvious gross abnormalities  Psychiatric: Alert and oriented ×3, normal affect            Robert Reeder MD  Colorectal & General Surgery  [facility], [place], operated by [facility]    [address], [address]  [place], [place], [address]  P: [phone]  F: [phone]

## 2024-03-20 ENCOUNTER — TRANSCRIBE ORDERS (OUTPATIENT)
Dept: LAB | Facility: HOSPITAL | Age: 64
End: 2024-03-20
Payer: COMMERCIAL

## 2024-03-20 ENCOUNTER — LAB (OUTPATIENT)
Dept: LAB | Facility: HOSPITAL | Age: 64
End: 2024-03-20
Payer: COMMERCIAL

## 2024-03-20 DIAGNOSIS — N18.31 CHRONIC KIDNEY DISEASE (CKD) STAGE G3A/A1, MODERATELY DECREASED GLOMERULAR FILTRATION RATE (GFR) BETWEEN 45-59 ML/MIN/1.73 SQUARE METER AND ALBUMINURIA CREATININE RATIO LESS THAN 30 MG/G (CMS/H*: Primary | ICD-10-CM

## 2024-03-20 DIAGNOSIS — N18.31 CHRONIC KIDNEY DISEASE (CKD) STAGE G3A/A1, MODERATELY DECREASED GLOMERULAR FILTRATION RATE (GFR) BETWEEN 45-59 ML/MIN/1.73 SQUARE METER AND ALBUMINURIA CREATININE RATIO LESS THAN 30 MG/G (CMS/H*: ICD-10-CM

## 2024-03-20 LAB
ALBUMIN SERPL-MCNC: 4.5 G/DL (ref 3.5–5.2)
ANION GAP SERPL CALCULATED.3IONS-SCNC: 10.1 MMOL/L (ref 5–15)
BACTERIA UR QL AUTO: NORMAL /HPF
BILIRUB UR QL STRIP: NEGATIVE
BUN SERPL-MCNC: 17 MG/DL (ref 8–23)
BUN/CREAT SERPL: 14 (ref 7–25)
CALCIUM SPEC-SCNC: 9.6 MG/DL (ref 8.6–10.5)
CHLORIDE SERPL-SCNC: 109 MMOL/L (ref 98–107)
CLARITY UR: CLEAR
CO2 SERPL-SCNC: 23.9 MMOL/L (ref 22–29)
COLOR UR: YELLOW
CREAT SERPL-MCNC: 1.21 MG/DL (ref 0.57–1)
CREAT UR-MCNC: 122.3 MG/DL
EGFRCR SERPLBLD CKD-EPI 2021: 50.8 ML/MIN/1.73
GLUCOSE SERPL-MCNC: 90 MG/DL (ref 65–99)
GLUCOSE UR STRIP-MCNC: NEGATIVE MG/DL
HGB UR QL STRIP.AUTO: ABNORMAL
HYALINE CASTS UR QL AUTO: NORMAL /LPF
KETONES UR QL STRIP: NEGATIVE
LEUKOCYTE ESTERASE UR QL STRIP.AUTO: NEGATIVE
NITRITE UR QL STRIP: NEGATIVE
PH UR STRIP.AUTO: <=5 [PH] (ref 5–8)
PHOSPHATE SERPL-MCNC: 3 MG/DL (ref 2.5–4.5)
POTASSIUM SERPL-SCNC: 4 MMOL/L (ref 3.5–5.2)
PROT ?TM UR-MCNC: 6.5 MG/DL
PROT UR QL STRIP: NEGATIVE
PROT/CREAT UR: 53.1 MG/G CREA (ref 0–200)
RBC # UR STRIP: NORMAL /HPF
REF LAB TEST METHOD: NORMAL
SODIUM SERPL-SCNC: 143 MMOL/L (ref 136–145)
SP GR UR STRIP: 1.02 (ref 1–1.03)
SQUAMOUS #/AREA URNS HPF: NORMAL /HPF
UROBILINOGEN UR QL STRIP: ABNORMAL
WBC # UR STRIP: NORMAL /HPF

## 2024-03-20 PROCEDURE — 80069 RENAL FUNCTION PANEL: CPT | Performed by: NURSE PRACTITIONER

## 2024-03-20 PROCEDURE — 81001 URINALYSIS AUTO W/SCOPE: CPT

## 2024-03-20 PROCEDURE — 82570 ASSAY OF URINE CREATININE: CPT

## 2024-03-20 PROCEDURE — 84156 ASSAY OF PROTEIN URINE: CPT

## 2024-03-20 PROCEDURE — 36415 COLL VENOUS BLD VENIPUNCTURE: CPT | Performed by: NURSE PRACTITIONER

## 2024-08-21 PROCEDURE — 88305 TISSUE EXAM BY PATHOLOGIST: CPT | Performed by: STUDENT IN AN ORGANIZED HEALTH CARE EDUCATION/TRAINING PROGRAM

## 2024-08-22 ENCOUNTER — LAB REQUISITION (OUTPATIENT)
Dept: LAB | Facility: HOSPITAL | Age: 64
End: 2024-08-22
Payer: COMMERCIAL

## 2024-08-22 DIAGNOSIS — H02.822 CYSTS OF RIGHT LOWER EYELID: ICD-10-CM

## 2024-08-26 LAB
CYTO UR: NORMAL
LAB AP CASE REPORT: NORMAL
PATH REPORT.FINAL DX SPEC: NORMAL
PATH REPORT.GROSS SPEC: NORMAL

## 2024-09-20 ENCOUNTER — TRANSCRIBE ORDERS (OUTPATIENT)
Dept: ADMINISTRATIVE | Facility: HOSPITAL | Age: 64
End: 2024-09-20
Payer: COMMERCIAL

## 2024-09-20 ENCOUNTER — LAB (OUTPATIENT)
Dept: LAB | Facility: HOSPITAL | Age: 64
End: 2024-09-20
Payer: COMMERCIAL

## 2024-09-20 DIAGNOSIS — N18.31 CHRONIC KIDNEY DISEASE (CKD) STAGE G3A/A1, MODERATELY DECREASED GLOMERULAR FILTRATION RATE (GFR) BETWEEN 45-59 ML/MIN/1.73 SQUARE METER AND ALBUMINURIA CREATININE RATIO LESS THAN 30 MG/G (CMS/H*: Primary | ICD-10-CM

## 2024-09-20 DIAGNOSIS — M79.7 SCAPULOHUMERAL FIBROSITIS: ICD-10-CM

## 2024-09-20 DIAGNOSIS — M19.90 SENILE ARTHRITIS: ICD-10-CM

## 2024-09-20 DIAGNOSIS — N18.31 CHRONIC KIDNEY DISEASE (CKD) STAGE G3A/A1, MODERATELY DECREASED GLOMERULAR FILTRATION RATE (GFR) BETWEEN 45-59 ML/MIN/1.73 SQUARE METER AND ALBUMINURIA CREATININE RATIO LESS THAN 30 MG/G (CMS/H*: ICD-10-CM

## 2024-09-20 LAB
25(OH)D3 SERPL-MCNC: 47.6 NG/ML (ref 30–100)
ALBUMIN SERPL-MCNC: 4.5 G/DL (ref 3.5–5.2)
ANION GAP SERPL CALCULATED.3IONS-SCNC: 10.8 MMOL/L (ref 5–15)
BACTERIA UR QL AUTO: ABNORMAL /HPF
BASOPHILS # BLD AUTO: 0.07 10*3/MM3 (ref 0–0.2)
BASOPHILS NFR BLD AUTO: 0.8 % (ref 0–1.5)
BILIRUB UR QL STRIP: NEGATIVE
BUN SERPL-MCNC: 20 MG/DL (ref 8–23)
BUN/CREAT SERPL: 16.7 (ref 7–25)
CALCIUM SPEC-SCNC: 9.6 MG/DL (ref 8.6–10.5)
CALCIUM SPEC-SCNC: 9.8 MG/DL (ref 8.6–10.5)
CHLORIDE SERPL-SCNC: 104 MMOL/L (ref 98–107)
CLARITY UR: CLEAR
CO2 SERPL-SCNC: 24.2 MMOL/L (ref 22–29)
COLOR UR: YELLOW
CREAT SERPL-MCNC: 1.2 MG/DL (ref 0.57–1)
CREAT UR-MCNC: 135.9 MG/DL
DEPRECATED RDW RBC AUTO: 46.3 FL (ref 37–54)
EGFRCR SERPLBLD CKD-EPI 2021: 51.3 ML/MIN/1.73
EOSINOPHIL # BLD AUTO: 0.17 10*3/MM3 (ref 0–0.4)
EOSINOPHIL NFR BLD AUTO: 2 % (ref 0.3–6.2)
ERYTHROCYTE [DISTWIDTH] IN BLOOD BY AUTOMATED COUNT: 14.5 % (ref 12.3–15.4)
GLUCOSE SERPL-MCNC: 95 MG/DL (ref 65–99)
GLUCOSE UR STRIP-MCNC: NEGATIVE MG/DL
HCT VFR BLD AUTO: 38.9 % (ref 34–46.6)
HGB BLD-MCNC: 12.6 G/DL (ref 12–15.9)
HGB UR QL STRIP.AUTO: ABNORMAL
HYALINE CASTS UR QL AUTO: ABNORMAL /LPF
IMM GRANULOCYTES # BLD AUTO: 0.02 10*3/MM3 (ref 0–0.05)
IMM GRANULOCYTES NFR BLD AUTO: 0.2 % (ref 0–0.5)
KETONES UR QL STRIP: NEGATIVE
LEUKOCYTE ESTERASE UR QL STRIP.AUTO: ABNORMAL
LYMPHOCYTES # BLD AUTO: 3.22 10*3/MM3 (ref 0.7–3.1)
LYMPHOCYTES NFR BLD AUTO: 38.4 % (ref 19.6–45.3)
MAGNESIUM SERPL-MCNC: 2 MG/DL (ref 1.6–2.4)
MCH RBC QN AUTO: 28.3 PG (ref 26.6–33)
MCHC RBC AUTO-ENTMCNC: 32.4 G/DL (ref 31.5–35.7)
MCV RBC AUTO: 87.4 FL (ref 79–97)
MONOCYTES # BLD AUTO: 0.72 10*3/MM3 (ref 0.1–0.9)
MONOCYTES NFR BLD AUTO: 8.6 % (ref 5–12)
NEUTROPHILS NFR BLD AUTO: 4.19 10*3/MM3 (ref 1.7–7)
NEUTROPHILS NFR BLD AUTO: 50 % (ref 42.7–76)
NITRITE UR QL STRIP: NEGATIVE
NRBC BLD AUTO-RTO: 0 /100 WBC (ref 0–0.2)
PH UR STRIP.AUTO: 5.5 [PH] (ref 5–8)
PHOSPHATE SERPL-MCNC: 3.4 MG/DL (ref 2.5–4.5)
PLATELET # BLD AUTO: 292 10*3/MM3 (ref 140–450)
PMV BLD AUTO: 9.5 FL (ref 6–12)
POTASSIUM SERPL-SCNC: 3.6 MMOL/L (ref 3.5–5.2)
PROT ?TM UR-MCNC: 6.9 MG/DL
PROT UR QL STRIP: NEGATIVE
PROT/CREAT UR: 50.8 MG/G CREA (ref 0–200)
PTH-INTACT SERPL-MCNC: 27.7 PG/ML (ref 15–65)
RBC # BLD AUTO: 4.45 10*6/MM3 (ref 3.77–5.28)
RBC # UR STRIP: ABNORMAL /HPF
REF LAB TEST METHOD: ABNORMAL
SODIUM SERPL-SCNC: 139 MMOL/L (ref 136–145)
SP GR UR STRIP: 1.02 (ref 1–1.03)
SQUAMOUS #/AREA URNS HPF: ABNORMAL /HPF
UROBILINOGEN UR QL STRIP: ABNORMAL
WBC # UR STRIP: ABNORMAL /HPF
WBC NRBC COR # BLD AUTO: 8.39 10*3/MM3 (ref 3.4–10.8)

## 2024-09-20 PROCEDURE — 81001 URINALYSIS AUTO W/SCOPE: CPT

## 2024-09-20 PROCEDURE — 82306 VITAMIN D 25 HYDROXY: CPT

## 2024-09-20 PROCEDURE — 36415 COLL VENOUS BLD VENIPUNCTURE: CPT

## 2024-09-20 PROCEDURE — 85025 COMPLETE CBC W/AUTO DIFF WBC: CPT

## 2024-09-20 PROCEDURE — 82310 ASSAY OF CALCIUM: CPT

## 2024-09-20 PROCEDURE — 82570 ASSAY OF URINE CREATININE: CPT

## 2024-09-20 PROCEDURE — 84156 ASSAY OF PROTEIN URINE: CPT

## 2024-09-20 PROCEDURE — 83735 ASSAY OF MAGNESIUM: CPT

## 2024-09-20 PROCEDURE — 80069 RENAL FUNCTION PANEL: CPT

## 2024-09-20 PROCEDURE — 83970 ASSAY OF PARATHORMONE: CPT

## (undated) DEVICE — TUBING, SUCTION, 1/4" X 10', STRAIGHT: Brand: MEDLINE

## (undated) DEVICE — CANN O2 ETCO2 FITS ALL CONN CO2 SMPL A/ 7IN DISP LF

## (undated) DEVICE — ADAPT CLN BIOGUARD AIR/H2O DISP

## (undated) DEVICE — KT ORCA ORCAPOD DISP STRL

## (undated) DEVICE — SENSR O2 OXIMAX FNGR A/ 18IN NONSTR